# Patient Record
Sex: FEMALE | Race: WHITE | NOT HISPANIC OR LATINO | Employment: UNEMPLOYED | URBAN - METROPOLITAN AREA
[De-identification: names, ages, dates, MRNs, and addresses within clinical notes are randomized per-mention and may not be internally consistent; named-entity substitution may affect disease eponyms.]

---

## 2023-02-03 PROBLEM — J02.9 SORE THROAT: Status: RESOLVED | Noted: 2023-02-03 | Resolved: 2023-02-03

## 2023-02-03 PROBLEM — L03.031 PARONYCHIA OF GREAT TOE, RIGHT: Status: ACTIVE | Noted: 2023-02-03

## 2023-02-03 PROBLEM — M25.569 KNEE PAIN: Status: ACTIVE | Noted: 2023-02-03

## 2023-02-03 PROBLEM — M26.629 TMJ PAIN DYSFUNCTION SYNDROME: Status: ACTIVE | Noted: 2023-02-03

## 2023-02-03 PROBLEM — F41.9 ANXIETY DISORDER: Status: ACTIVE | Noted: 2023-02-03

## 2023-02-03 PROBLEM — M75.52 SUBACROMIAL BURSITIS OF LEFT SHOULDER JOINT: Status: ACTIVE | Noted: 2023-02-03

## 2023-02-03 PROBLEM — M25.519 SHOULDER PAIN: Status: ACTIVE | Noted: 2023-02-03

## 2023-02-03 PROBLEM — E03.9 HYPOTHYROIDISM: Status: ACTIVE | Noted: 2023-02-03

## 2023-02-03 PROBLEM — M25.512 PAIN IN JOINT OF LEFT SHOULDER: Status: ACTIVE | Noted: 2023-02-03

## 2023-02-03 PROBLEM — E78.00 HYPERCHOLESTEROLEMIA: Status: ACTIVE | Noted: 2023-02-03

## 2023-02-03 PROBLEM — M89.9 SCAPULAR DYSFUNCTION: Status: ACTIVE | Noted: 2023-02-03

## 2023-02-03 PROBLEM — M79.676 TOE PAIN: Status: ACTIVE | Noted: 2023-02-03

## 2023-02-03 PROBLEM — L71.0 PERIORAL DERMATITIS: Status: ACTIVE | Noted: 2023-02-03

## 2023-02-03 PROBLEM — R73.9 ELEVATED BLOOD SUGAR: Status: ACTIVE | Noted: 2023-02-03

## 2023-02-03 PROBLEM — R93.1 ABNORMAL HEART SCORE CT: Status: ACTIVE | Noted: 2023-02-03

## 2023-02-03 PROBLEM — L30.9 ECZEMA: Status: ACTIVE | Noted: 2023-02-03

## 2023-02-03 PROBLEM — E55.9 VITAMIN D DEFICIENCY: Status: ACTIVE | Noted: 2023-02-03

## 2023-02-03 PROBLEM — L29.9: Status: ACTIVE | Noted: 2023-02-03

## 2023-02-03 RX ORDER — ROSUVASTATIN CALCIUM 40 MG/1
1 TABLET, COATED ORAL DAILY
COMMUNITY
Start: 2019-11-11 | End: 2023-04-18 | Stop reason: SDUPTHER

## 2023-02-03 RX ORDER — PHENYLPROPANOLAMINE/CLEMASTINE 75-1.34MG
TABLET, EXTENDED RELEASE ORAL
COMMUNITY
Start: 2019-11-11 | End: 2023-04-18

## 2023-02-03 RX ORDER — HYALURONATE SODIUM 0.2 %
CREAM (GRAM) TOPICAL
COMMUNITY
Start: 2019-11-11 | End: 2023-04-18

## 2023-02-03 RX ORDER — ASPIRIN 81 MG/1
1 TABLET ORAL DAILY
COMMUNITY
Start: 2019-11-11

## 2023-02-03 RX ORDER — MOMETASONE FUROATE 1 MG/G
CREAM TOPICAL
COMMUNITY
Start: 2018-08-20 | End: 2023-03-24 | Stop reason: SDUPTHER

## 2023-02-03 RX ORDER — LEVOTHYROXINE SODIUM 100 UG/1
1 TABLET ORAL DAILY
COMMUNITY
Start: 2018-08-20 | End: 2023-04-18 | Stop reason: SDUPTHER

## 2023-02-03 RX ORDER — CLOTRIMAZOLE AND BETAMETHASONE DIPROPIONATE 10; .64 MG/G; MG/G
CREAM TOPICAL
COMMUNITY
Start: 2013-05-17

## 2023-02-03 RX ORDER — HYDROCORTISONE 25 MG/G
CREAM TOPICAL
COMMUNITY
Start: 2019-11-11 | End: 2023-04-18 | Stop reason: SDUPTHER

## 2023-02-03 RX ORDER — GUAIFENESIN 1200 MG
TABLET, EXTENDED RELEASE 12 HR ORAL
COMMUNITY
Start: 2019-11-11

## 2023-02-03 RX ORDER — MAG HYDROX/ALUMINUM HYD/SIMETH 200-200-20
SUSPENSION, ORAL (FINAL DOSE FORM) ORAL 2 TIMES DAILY
COMMUNITY
Start: 2019-11-11 | End: 2023-04-18

## 2023-02-14 PROBLEM — E05.90 HYPERTHYROIDISM: Status: ACTIVE | Noted: 2023-02-14

## 2023-03-24 DIAGNOSIS — L08.9 SKIN INFLAMMATION: ICD-10-CM

## 2023-03-26 RX ORDER — MOMETASONE FUROATE 1 MG/G
CREAM TOPICAL DAILY
Qty: 50 G | Refills: 0 | Status: SHIPPED | OUTPATIENT
Start: 2023-03-26 | End: 2023-10-10 | Stop reason: SDUPTHER

## 2023-04-15 LAB
ALANINE AMINOTRANSFERASE (SGPT) (U/L) IN SER/PLAS: 20 U/L (ref 7–45)
ALBUMIN (G/DL) IN SER/PLAS: 4.5 G/DL (ref 3.4–5)
ALKALINE PHOSPHATASE (U/L) IN SER/PLAS: 62 U/L (ref 33–136)
ANION GAP IN SER/PLAS: 14 MMOL/L (ref 10–20)
ASPARTATE AMINOTRANSFERASE (SGOT) (U/L) IN SER/PLAS: 25 U/L (ref 9–39)
BILIRUBIN TOTAL (MG/DL) IN SER/PLAS: 1.3 MG/DL (ref 0–1.2)
CALCIDIOL (25 OH VITAMIN D3) (NG/ML) IN SER/PLAS: 34 NG/ML
CALCIUM (MG/DL) IN SER/PLAS: 9.7 MG/DL (ref 8.6–10.6)
CARBON DIOXIDE, TOTAL (MMOL/L) IN SER/PLAS: 27 MMOL/L (ref 21–32)
CHLORIDE (MMOL/L) IN SER/PLAS: 105 MMOL/L (ref 98–107)
CHOLESTEROL (MG/DL) IN SER/PLAS: 158 MG/DL (ref 0–199)
CHOLESTEROL IN HDL (MG/DL) IN SER/PLAS: 64.5 MG/DL
CHOLESTEROL/HDL RATIO: 2.4
COBALAMIN (VITAMIN B12) (PG/ML) IN SER/PLAS: 321 PG/ML (ref 211–911)
CREATININE (MG/DL) IN SER/PLAS: 0.63 MG/DL (ref 0.5–1.05)
ERYTHROCYTE DISTRIBUTION WIDTH (RATIO) BY AUTOMATED COUNT: 12.9 % (ref 11.5–14.5)
ERYTHROCYTE MEAN CORPUSCULAR HEMOGLOBIN CONCENTRATION (G/DL) BY AUTOMATED: 31.5 G/DL (ref 32–36)
ERYTHROCYTE MEAN CORPUSCULAR VOLUME (FL) BY AUTOMATED COUNT: 93 FL (ref 80–100)
ERYTHROCYTES (10*6/UL) IN BLOOD BY AUTOMATED COUNT: 4.38 X10E12/L (ref 4–5.2)
GFR FEMALE: >90 ML/MIN/1.73M2
GLUCOSE (MG/DL) IN SER/PLAS: 82 MG/DL (ref 74–99)
HEMATOCRIT (%) IN BLOOD BY AUTOMATED COUNT: 40.6 % (ref 36–46)
HEMOGLOBIN (G/DL) IN BLOOD: 12.8 G/DL (ref 12–16)
LDL: 75 MG/DL (ref 0–99)
LEUKOCYTES (10*3/UL) IN BLOOD BY AUTOMATED COUNT: 5.2 X10E9/L (ref 4.4–11.3)
NRBC (PER 100 WBCS) BY AUTOMATED COUNT: 0 /100 WBC (ref 0–0)
PLATELETS (10*3/UL) IN BLOOD AUTOMATED COUNT: 243 X10E9/L (ref 150–450)
POTASSIUM (MMOL/L) IN SER/PLAS: 4.6 MMOL/L (ref 3.5–5.3)
PROTEIN TOTAL: 6.6 G/DL (ref 6.4–8.2)
SODIUM (MMOL/L) IN SER/PLAS: 141 MMOL/L (ref 136–145)
THYROTROPIN (MIU/L) IN SER/PLAS BY DETECTION LIMIT <= 0.05 MIU/L: 3.72 MIU/L (ref 0.44–3.98)
TRIGLYCERIDE (MG/DL) IN SER/PLAS: 94 MG/DL (ref 0–149)
UREA NITROGEN (MG/DL) IN SER/PLAS: 14 MG/DL (ref 6–23)
VLDL: 19 MG/DL (ref 0–40)

## 2023-04-18 ENCOUNTER — OFFICE VISIT (OUTPATIENT)
Dept: PRIMARY CARE | Facility: CLINIC | Age: 63
End: 2023-04-18
Payer: COMMERCIAL

## 2023-04-18 VITALS
BODY MASS INDEX: 29.77 KG/M2 | HEIGHT: 63 IN | WEIGHT: 168 LBS | DIASTOLIC BLOOD PRESSURE: 74 MMHG | SYSTOLIC BLOOD PRESSURE: 130 MMHG

## 2023-04-18 DIAGNOSIS — E55.9 VITAMIN D DEFICIENCY: ICD-10-CM

## 2023-04-18 DIAGNOSIS — E05.90 HYPERTHYROIDISM: ICD-10-CM

## 2023-04-18 DIAGNOSIS — Z00.00 PREVENTATIVE HEALTH CARE: Primary | ICD-10-CM

## 2023-04-18 DIAGNOSIS — L30.9 ECZEMA, UNSPECIFIED TYPE: ICD-10-CM

## 2023-04-18 DIAGNOSIS — E78.5 HYPERLIPIDEMIA, UNSPECIFIED HYPERLIPIDEMIA TYPE: ICD-10-CM

## 2023-04-18 PROCEDURE — 1036F TOBACCO NON-USER: CPT | Performed by: INTERNAL MEDICINE

## 2023-04-18 PROCEDURE — 99396 PREV VISIT EST AGE 40-64: CPT | Performed by: INTERNAL MEDICINE

## 2023-04-18 PROCEDURE — 93000 ELECTROCARDIOGRAM COMPLETE: CPT | Performed by: INTERNAL MEDICINE

## 2023-04-18 RX ORDER — LEVOTHYROXINE SODIUM 100 UG/1
100 TABLET ORAL DAILY
Qty: 90 TABLET | Refills: 1 | Status: SHIPPED | OUTPATIENT
Start: 2023-04-18 | End: 2023-04-25

## 2023-04-18 RX ORDER — FLUTICASONE PROPIONATE 50 MCG
2 SPRAY, SUSPENSION (ML) NASAL DAILY
COMMUNITY
Start: 2022-05-14

## 2023-04-18 RX ORDER — HYDROCORTISONE 25 MG/G
CREAM TOPICAL 2 TIMES DAILY
Qty: 28 G | Refills: 1 | Status: SHIPPED | OUTPATIENT
Start: 2023-04-18 | End: 2024-04-09 | Stop reason: SDUPTHER

## 2023-04-18 RX ORDER — ROSUVASTATIN CALCIUM 40 MG/1
40 TABLET, COATED ORAL DAILY
Qty: 90 TABLET | Refills: 1 | Status: SHIPPED | OUTPATIENT
Start: 2023-04-18 | End: 2023-07-30

## 2023-04-18 NOTE — PROGRESS NOTES
"Subjective   Patient ID: Rose Grigsby is a 62 y.o. female who presents for Follow-up and Med Refill.    Med Refill  Patient is here for physical   needs refills on medication  Follow-up on hypothyroidism and high cholesterol  Wants refill on steroid cream for ear itch  Wants refill on steroid cream for dry skin which is prescribed by her dermatologist but she does not want to go them again    Past recap  Patient is here for follow-up/physical  Follow-up on high cholesterol hypothyroidism  Patient is still trying to get her dental work bill which have been from her fall in the parking lot at [employer]. done with work      Social history non-smoker family history Brother  at 42 of [place] father  at 69 of [place] Sister had colon cancer  Complaining of left foot big toe pain she had surgery for ingrown toenail     Cardiologist had change her cholesterol medication to Crestor 40 mg a day but last time her liver function was high so we decided to cut down the dose to 20 mg a day     Patient is here for follow-up on blood work  Needs taking shingles vaccine  Needs refills on medications for high cholesterol and hypothyroidism  Did blood work  Had a fall in [employer] or below 3 front teeth        past recap  To reestablish PCP  Follow-up on hypothyroidism needs blood work results and renewal of medication  Her calcium cardiac scoring was high she is on statin she saw cardiologist and had stress test which was negative  Also due for vitamin D check she had low in the past  She has lost body weight secondary to dieting  She was told GYN Dr. Hernández  This history of diverticular disease  She has a daughter with spatial needs she takes care of her  Older Sis has precancerous polyp        Review of Systems    Objective   /74   Ht 1.6 m (5' 3\")   Wt 76.2 kg (168 lb)   BMI 29.76 kg/m²     Physical Exam  Vitals reviewed.   Constitutional:       Appearance: Normal appearance.   HENT:      Head: Normocephalic and " atraumatic.      Right Ear: Tympanic membrane, ear canal and external ear normal.      Left Ear: Tympanic membrane, ear canal and external ear normal.      Nose: Nose normal.      Mouth/Throat:      Pharynx: Oropharynx is clear.   Eyes:      Extraocular Movements: Extraocular movements intact.      Conjunctiva/sclera: Conjunctivae normal.      Pupils: Pupils are equal, round, and reactive to light.   Cardiovascular:      Rate and Rhythm: Normal rate and regular rhythm.      Pulses: Normal pulses.      Heart sounds: Normal heart sounds.   Pulmonary:      Effort: Pulmonary effort is normal.      Breath sounds: Normal breath sounds.   Abdominal:      General: Abdomen is flat. Bowel sounds are normal.      Palpations: Abdomen is soft.   Musculoskeletal:      Cervical back: Normal range of motion and neck supple.   Skin:     General: Skin is warm and dry.   Neurological:      General: No focal deficit present.      Mental Status: She is alert and oriented to person, place, and time.   Psychiatric:         Mood and Affect: Mood normal.       Assessment/Plan   Problem List Items Addressed This Visit          Endocrine/Metabolic    Vitamin D deficiency    Relevant Orders    Vitamin D, Total    Vitamin B12    Comprehensive Metabolic Panel    Hyperthyroidism    Relevant Medications    levothyroxine (Synthroid, Levoxyl) 100 mcg tablet    Other Relevant Orders    Thyroid Stimulating Hormone    Vitamin B12    Comprehensive Metabolic Panel       Infectious/Inflammatory    Eczema    Relevant Medications    hydrocortisone 2.5 % cream    Other Relevant Orders    Vitamin B12    Comprehensive Metabolic Panel     Other Visit Diagnoses       Preventative health care    -  Primary    Relevant Orders    ECG 12 lead    Vitamin B12    Comprehensive Metabolic Panel    Hyperlipidemia, unspecified hyperlipidemia type        Relevant Medications    rosuvastatin (Crestor) 40 mg tablet    Other Relevant Orders    Vitamin B12    Comprehensive  Metabolic Panel    Lipid Panel          physical normal  Blood work reviewed  #1 Hypothyroidism  Synthroid 100 Âµg  #2 hyperlipidemia  Cholesterol well controlled on 20 mg of Crestor  #3 Eczema  Steroid cream prescribed  #4  Dry skin   Refills given on steroid cream advised to keep skin moisturized   BMD shows mild osteopenia.  Discussed diet calcium and vitamin D supplement and exercise   follow-up blood work in 6 months  Colonoscopy was in 2018

## 2023-04-25 DIAGNOSIS — E05.90 HYPERTHYROIDISM: ICD-10-CM

## 2023-04-25 RX ORDER — LEVOTHYROXINE SODIUM 100 UG/1
TABLET ORAL
Qty: 90 TABLET | Refills: 1 | Status: SHIPPED | OUTPATIENT
Start: 2023-04-25 | End: 2023-10-10 | Stop reason: SDUPTHER

## 2023-07-29 DIAGNOSIS — E78.5 HYPERLIPIDEMIA, UNSPECIFIED HYPERLIPIDEMIA TYPE: ICD-10-CM

## 2023-07-30 RX ORDER — ROSUVASTATIN CALCIUM 40 MG/1
40 TABLET, COATED ORAL DAILY
Qty: 90 TABLET | Refills: 0 | Status: SHIPPED | OUTPATIENT
Start: 2023-07-30 | End: 2024-04-09 | Stop reason: SDUPTHER

## 2023-10-06 ENCOUNTER — TELEPHONE (OUTPATIENT)
Dept: PRIMARY CARE | Facility: CLINIC | Age: 63
End: 2023-10-06
Payer: COMMERCIAL

## 2023-10-07 ENCOUNTER — LAB (OUTPATIENT)
Dept: LAB | Facility: LAB | Age: 63
End: 2023-10-07
Payer: COMMERCIAL

## 2023-10-07 DIAGNOSIS — E78.5 HYPERLIPIDEMIA, UNSPECIFIED HYPERLIPIDEMIA TYPE: ICD-10-CM

## 2023-10-07 DIAGNOSIS — E55.9 VITAMIN D DEFICIENCY: ICD-10-CM

## 2023-10-07 DIAGNOSIS — E05.90 HYPERTHYROIDISM: ICD-10-CM

## 2023-10-07 DIAGNOSIS — Z00.00 PREVENTATIVE HEALTH CARE: ICD-10-CM

## 2023-10-07 DIAGNOSIS — L30.9 ECZEMA, UNSPECIFIED TYPE: ICD-10-CM

## 2023-10-07 LAB
25(OH)D3 SERPL-MCNC: 43 NG/ML (ref 30–100)
ALBUMIN SERPL BCP-MCNC: 4.8 G/DL (ref 3.4–5)
ALP SERPL-CCNC: 63 U/L (ref 33–136)
ALT SERPL W P-5'-P-CCNC: 24 U/L (ref 7–45)
ANION GAP SERPL CALC-SCNC: 13 MMOL/L (ref 10–20)
AST SERPL W P-5'-P-CCNC: 29 U/L (ref 9–39)
BILIRUB SERPL-MCNC: 1.4 MG/DL (ref 0–1.2)
BUN SERPL-MCNC: 13 MG/DL (ref 6–23)
CALCIUM SERPL-MCNC: 9.6 MG/DL (ref 8.6–10.6)
CHLORIDE SERPL-SCNC: 104 MMOL/L (ref 98–107)
CHOLEST SERPL-MCNC: 170 MG/DL (ref 0–199)
CHOLESTEROL/HDL RATIO: 2.5
CO2 SERPL-SCNC: 29 MMOL/L (ref 21–32)
CREAT SERPL-MCNC: 0.75 MG/DL (ref 0.5–1.05)
GFR SERPL CREATININE-BSD FRML MDRD: 89 ML/MIN/1.73M*2
GLUCOSE SERPL-MCNC: 90 MG/DL (ref 74–99)
HDLC SERPL-MCNC: 66.7 MG/DL
LDLC SERPL CALC-MCNC: 75 MG/DL (ref 140–190)
NON HDL CHOLESTEROL: 103 MG/DL (ref 0–149)
POTASSIUM SERPL-SCNC: 4.4 MMOL/L (ref 3.5–5.3)
PROT SERPL-MCNC: 7.3 G/DL (ref 6.4–8.2)
SODIUM SERPL-SCNC: 142 MMOL/L (ref 136–145)
TRIGL SERPL-MCNC: 144 MG/DL (ref 0–149)
TSH SERPL-ACNC: 3.68 MIU/L (ref 0.44–3.98)
VIT B12 SERPL-MCNC: 313 PG/ML (ref 211–911)
VLDL: 29 MG/DL (ref 0–40)

## 2023-10-07 PROCEDURE — 80061 LIPID PANEL: CPT

## 2023-10-07 PROCEDURE — 82607 VITAMIN B-12: CPT

## 2023-10-07 PROCEDURE — 82306 VITAMIN D 25 HYDROXY: CPT

## 2023-10-07 PROCEDURE — 80053 COMPREHEN METABOLIC PANEL: CPT

## 2023-10-07 PROCEDURE — 84443 ASSAY THYROID STIM HORMONE: CPT

## 2023-10-07 PROCEDURE — 36415 COLL VENOUS BLD VENIPUNCTURE: CPT

## 2023-10-10 ENCOUNTER — OFFICE VISIT (OUTPATIENT)
Dept: PRIMARY CARE | Facility: CLINIC | Age: 63
End: 2023-10-10
Payer: COMMERCIAL

## 2023-10-10 VITALS
DIASTOLIC BLOOD PRESSURE: 66 MMHG | HEIGHT: 63 IN | SYSTOLIC BLOOD PRESSURE: 122 MMHG | BODY MASS INDEX: 29.59 KG/M2 | WEIGHT: 167 LBS

## 2023-10-10 DIAGNOSIS — Z23 NEED FOR INFLUENZA VACCINATION: ICD-10-CM

## 2023-10-10 DIAGNOSIS — L30.9 DERMATITIS: ICD-10-CM

## 2023-10-10 DIAGNOSIS — L08.9 SKIN INFLAMMATION: ICD-10-CM

## 2023-10-10 DIAGNOSIS — E78.00 HYPERCHOLESTEROLEMIA: ICD-10-CM

## 2023-10-10 DIAGNOSIS — E03.8 OTHER SPECIFIED HYPOTHYROIDISM: Primary | ICD-10-CM

## 2023-10-10 PROBLEM — L71.9 ROSACEA, UNSPECIFIED: Status: ACTIVE | Noted: 2020-11-17

## 2023-10-10 PROBLEM — D18.01 HEMANGIOMA OF SKIN AND SUBCUTANEOUS TISSUE: Status: ACTIVE | Noted: 2020-11-17

## 2023-10-10 PROBLEM — L81.4 OTHER MELANIN HYPERPIGMENTATION: Status: ACTIVE | Noted: 2020-11-17

## 2023-10-10 PROBLEM — L21.9 SEBORRHEIC DERMATITIS, UNSPECIFIED: Status: ACTIVE | Noted: 2020-11-17

## 2023-10-10 PROBLEM — N76.3 CHRONIC VULVITIS: Status: ACTIVE | Noted: 2023-10-10

## 2023-10-10 PROBLEM — L73.8 OTHER SPECIFIED FOLLICULAR DISORDERS: Status: ACTIVE | Noted: 2020-11-17

## 2023-10-10 PROBLEM — L28.1 PRURIGO NODULARIS: Status: ACTIVE | Noted: 2020-11-17

## 2023-10-10 PROBLEM — L85.3 XEROSIS CUTIS: Status: ACTIVE | Noted: 2020-11-17

## 2023-10-10 PROBLEM — L82.1 OTHER SEBORRHEIC KERATOSIS: Status: ACTIVE | Noted: 2020-11-17

## 2023-10-10 PROBLEM — E78.5 HYPERLIPIDEMIA: Status: ACTIVE | Noted: 2023-10-10

## 2023-10-10 PROBLEM — L91.8 OTHER HYPERTROPHIC DISORDERS OF THE SKIN: Status: ACTIVE | Noted: 2020-11-17

## 2023-10-10 PROBLEM — D22.5 MELANOCYTIC NEVI OF TRUNK: Status: ACTIVE | Noted: 2020-11-17

## 2023-10-10 PROBLEM — R92.8 ABNORMAL FINDING ON MAMMOGRAPHY: Status: ACTIVE | Noted: 2023-10-10

## 2023-10-10 PROBLEM — D22.30 MELANOCYTIC NEVI OF UNSPECIFIED PART OF FACE: Status: ACTIVE | Noted: 2020-11-17

## 2023-10-10 PROCEDURE — 90686 IIV4 VACC NO PRSV 0.5 ML IM: CPT | Performed by: INTERNAL MEDICINE

## 2023-10-10 PROCEDURE — 90471 IMMUNIZATION ADMIN: CPT | Performed by: INTERNAL MEDICINE

## 2023-10-10 PROCEDURE — 99213 OFFICE O/P EST LOW 20 MIN: CPT | Performed by: INTERNAL MEDICINE

## 2023-10-10 PROCEDURE — 1036F TOBACCO NON-USER: CPT | Performed by: INTERNAL MEDICINE

## 2023-10-10 RX ORDER — AMMONIUM LACTATE 12 G/100G
CREAM TOPICAL AS NEEDED
COMMUNITY

## 2023-10-10 RX ORDER — CHOLECALCIFEROL (VITAMIN D3) 50 MCG
50 TABLET ORAL DAILY
COMMUNITY

## 2023-10-10 RX ORDER — LEVOTHYROXINE SODIUM 100 UG/1
100 TABLET ORAL DAILY
Qty: 90 TABLET | Refills: 1 | Status: SHIPPED | OUTPATIENT
Start: 2023-10-10 | End: 2023-11-18

## 2023-10-10 RX ORDER — DICLOFENAC SODIUM 10 MG/G
4 GEL TOPICAL 4 TIMES DAILY
COMMUNITY

## 2023-10-10 RX ORDER — MOMETASONE FUROATE 1 MG/G
CREAM TOPICAL DAILY
Qty: 50 G | Refills: 1 | Status: SHIPPED | OUTPATIENT
Start: 2023-10-10 | End: 2024-04-09 | Stop reason: SDUPTHER

## 2023-10-10 NOTE — PROGRESS NOTES
"Subjective   Patient ID: Rose Grigsby is a 62 y.o. female who presents for Follow-up (6 month follow up).    Med Refill    Patient is here for follow-up  needs refills on medication  Follow-up on hypothyroidism and high cholesterol  Wants refill on steroid cream for ear itch  Wants refill on steroid cream for dry skin which is prescribed by her dermatologist but she does not want to go them again  Colonoscopy in 2018 had diverticulosis    Past recap  Patient is here for follow-up/physical  Follow-up on high cholesterol hypothyroidism  Patient is still trying to get her dental work bill which have been from her fall in the parking lot at [employer]. done with work      Social history non-smoker family history Brother  at 42 of MI father  at 69 of [place] Sister had colon cancer  Complaining of left foot big toe pain she had surgery for ingrown toenail     Cardiologist had change her cholesterol medication to Crestor 40 mg a day but last time her liver function was high so we decided to cut down the dose to 20 mg a day     Patient is here for follow-up on blood work  Needs taking shingles vaccine  Needs refills on medications for high cholesterol and hypothyroidism  Did blood work  Had a fall in [employer] parking or below 3 front teeth        past recap  To reestablish PCP  Follow-up on hypothyroidism needs blood work results and renewal of medication  Her calcium cardiac scoring was high she is on statin she saw cardiologist and had stress test which was negative  Also due for vitamin D check she had low in the past  She has lost body weight secondary to dieting  She was told GYN Dr. Hernández  This history of diverticular disease  She has a daughter with spatial needs she takes care of her  Older Sis has precancerous polyp        Review of Systems    Objective   /66   Ht 1.6 m (5' 3\")   Wt 75.8 kg (167 lb)   BMI 29.58 kg/m²     Physical Exam  Vitals reviewed.   Constitutional:       Appearance: Normal " appearance.   HENT:      Head: Normocephalic and atraumatic.      Right Ear: Tympanic membrane, ear canal and external ear normal.      Left Ear: Tympanic membrane, ear canal and external ear normal.      Nose: Nose normal.      Mouth/Throat:      Pharynx: Oropharynx is clear.   Eyes:      Extraocular Movements: Extraocular movements intact.      Conjunctiva/sclera: Conjunctivae normal.      Pupils: Pupils are equal, round, and reactive to light.   Cardiovascular:      Rate and Rhythm: Normal rate and regular rhythm.      Pulses: Normal pulses.      Heart sounds: Normal heart sounds.   Pulmonary:      Effort: Pulmonary effort is normal.      Breath sounds: Normal breath sounds.   Abdominal:      General: Abdomen is flat. Bowel sounds are normal.      Palpations: Abdomen is soft.   Musculoskeletal:      Cervical back: Normal range of motion and neck supple.   Skin:     General: Skin is warm and dry.   Neurological:      General: No focal deficit present.      Mental Status: She is alert and oriented to person, place, and time.   Psychiatric:         Mood and Affect: Mood normal.         Assessment/Plan   Problem List Items Addressed This Visit          Cardiac and Vasculature    Hypercholesterolemia    Relevant Orders    Comprehensive Metabolic Panel    Lipid Panel       Endocrine/Metabolic    Hyperthyroidism - Primary    Relevant Medications    Synthroid 100 mcg tablet       Skin    Dermatitis     Other Visit Diagnoses       Skin inflammation        Relevant Medications    mometasone (Elocon) 0.1 % cream    Need for influenza vaccination        Relevant Orders    Flu vaccine (IIV4) age 6 months and greater, preservative free (Completed)            Blood work reviewed  #1 Hypothyroidism  Synthroid 100 Âµg  #2 hyperlipidemia  Cholesterol well controlled on 20 mg of Crestor  #3 Eczema  Steroid cream prescribed  #4  Dry skin   Refills given on steroid cream advised to keep skin moisturized   BMD shows mild osteopenia.   Discussed diet calcium and vitamin D supplement and exercise   follow-up blood work in 6 months  Colonoscopy was in 2018

## 2023-11-09 ENCOUNTER — ANCILLARY PROCEDURE (OUTPATIENT)
Dept: RADIOLOGY | Facility: CLINIC | Age: 63
End: 2023-11-09
Payer: COMMERCIAL

## 2023-11-09 ENCOUNTER — APPOINTMENT (OUTPATIENT)
Dept: RADIOLOGY | Facility: CLINIC | Age: 63
End: 2023-11-09
Payer: COMMERCIAL

## 2023-11-09 ENCOUNTER — OFFICE VISIT (OUTPATIENT)
Dept: PRIMARY CARE | Facility: CLINIC | Age: 63
End: 2023-11-09
Payer: COMMERCIAL

## 2023-11-09 VITALS
SYSTOLIC BLOOD PRESSURE: 122 MMHG | HEIGHT: 63 IN | BODY MASS INDEX: 29.06 KG/M2 | WEIGHT: 164 LBS | DIASTOLIC BLOOD PRESSURE: 78 MMHG

## 2023-11-09 DIAGNOSIS — R35.0 URINARY FREQUENCY: ICD-10-CM

## 2023-11-09 DIAGNOSIS — M54.50 ACUTE BILATERAL LOW BACK PAIN WITHOUT SCIATICA: ICD-10-CM

## 2023-11-09 DIAGNOSIS — N30.01 ACUTE CYSTITIS WITH HEMATURIA: ICD-10-CM

## 2023-11-09 DIAGNOSIS — R31.9 HEMATURIA, UNSPECIFIED TYPE: ICD-10-CM

## 2023-11-09 LAB
POC APPEARANCE, URINE: CLEAR
POC BILIRUBIN, URINE: NEGATIVE
POC BLOOD, URINE: ABNORMAL
POC COLOR, URINE: YELLOW
POC GLUCOSE, URINE: NEGATIVE MG/DL
POC KETONES, URINE: NEGATIVE MG/DL
POC LEUKOCYTES, URINE: NEGATIVE
POC NITRITE,URINE: NEGATIVE
POC PH, URINE: 6 PH
POC PROTEIN, URINE: NEGATIVE MG/DL
POC SPECIFIC GRAVITY, URINE: <=1.005
POC UROBILINOGEN, URINE: 0.2 EU/DL

## 2023-11-09 PROCEDURE — 72110 X-RAY EXAM L-2 SPINE 4/>VWS: CPT | Mod: FOREIGN READ | Performed by: RADIOLOGY

## 2023-11-09 PROCEDURE — 72110 X-RAY EXAM L-2 SPINE 4/>VWS: CPT | Mod: FR

## 2023-11-09 PROCEDURE — 99214 OFFICE O/P EST MOD 30 MIN: CPT | Performed by: INTERNAL MEDICINE

## 2023-11-09 PROCEDURE — 76770 US EXAM ABDO BACK WALL COMP: CPT

## 2023-11-09 PROCEDURE — 81003 URINALYSIS AUTO W/O SCOPE: CPT | Performed by: INTERNAL MEDICINE

## 2023-11-09 PROCEDURE — 74176 CT ABD & PELVIS W/O CONTRAST: CPT

## 2023-11-09 PROCEDURE — 87086 URINE CULTURE/COLONY COUNT: CPT

## 2023-11-09 PROCEDURE — 1036F TOBACCO NON-USER: CPT | Performed by: INTERNAL MEDICINE

## 2023-11-09 PROCEDURE — 74176 CT ABD & PELVIS W/O CONTRAST: CPT | Performed by: RADIOLOGY

## 2023-11-09 PROCEDURE — 76770 US EXAM ABDO BACK WALL COMP: CPT | Performed by: RADIOLOGY

## 2023-11-09 ASSESSMENT — ENCOUNTER SYMPTOMS
DEPRESSION: 0
LOSS OF SENSATION IN FEET: 0
OCCASIONAL FEELINGS OF UNSTEADINESS: 0
BACK PAIN: 1

## 2023-11-10 LAB — BACTERIA UR CULT: NO GROWTH

## 2023-11-10 NOTE — PROGRESS NOTES
Subjective   Patient ID: Rose Grigsby is a 62 y.o. female who presents for Back Pain (On going x4 days).    Med Refill    Back Pain    Patient is here with complaints of having back pain for last 4 days denies any burning urination or pain started on  and Monday got worse Tuesday tried Bengay but the back pain is not getting better  Denies any trauma     patient is here for follow-up  needs refills on medication  Follow-up on hypothyroidism and high cholesterol  Wants refill on steroid cream for ear itch  Wants refill on steroid cream for dry skin which is prescribed by her dermatologist but she does not want to go them again  Colonoscopy in 2018 had diverticulosis    Past recap  Patient is here for follow-up/physical  Follow-up on high cholesterol hypothyroidism  Patient is still trying to get her dental work bill which have been from her fall in the parking lot at [facility]. done with work      Social history non-smoker family history Brother  at 42 of MI father  at 69 of [place] Sister had colon cancer  Complaining of left foot big toe pain she had surgery for ingrown toenail     Cardiologist had change her cholesterol medication to Crestor 40 mg a day but last time her liver function was high so we decided to cut down the dose to 20 mg a day     Patient is here for follow-up on blood work  Needs taking shingles vaccine  Needs refills on medications for high cholesterol and hypothyroidism  Did blood work  Had a fall in [employer] parking or below 3 front teeth        past recap  To reestablish PCP  Follow-up on hypothyroidism needs blood work results and renewal of medication  Her calcium cardiac scoring was high she is on statin she saw cardiologist and had stress test which was negative  Also due for vitamin D check she had low in the past  She has lost body weight secondary to dieting  She was told GYN Dr. Hernández  This history of diverticular disease  She has a daughter with spatial needs she  "takes care of her  Older Sis has precancerous polyp        Review of Systems   Musculoskeletal:  Positive for back pain.       Objective   /78   Ht 1.6 m (5' 3\")   Wt 74.4 kg (164 lb)   BMI 29.05 kg/m²     Physical Exam  Vitals reviewed.   Constitutional:       Appearance: Normal appearance.   HENT:      Head: Normocephalic and atraumatic.      Right Ear: Tympanic membrane, ear canal and external ear normal.      Left Ear: Tympanic membrane, ear canal and external ear normal.      Nose: Nose normal.      Mouth/Throat:      Pharynx: Oropharynx is clear.   Eyes:      Extraocular Movements: Extraocular movements intact.      Conjunctiva/sclera: Conjunctivae normal.      Pupils: Pupils are equal, round, and reactive to light.   Cardiovascular:      Rate and Rhythm: Normal rate and regular rhythm.      Pulses: Normal pulses.      Heart sounds: Normal heart sounds.   Pulmonary:      Effort: Pulmonary effort is normal.      Breath sounds: Normal breath sounds.   Abdominal:      General: Abdomen is flat. Bowel sounds are normal.      Palpations: Abdomen is soft.   Musculoskeletal:         General: Tenderness present.      Cervical back: Normal range of motion and neck supple.      Comments: Tenderness over sacroiliac joint   Skin:     General: Skin is warm and dry.   Neurological:      General: No focal deficit present.      Mental Status: She is alert and oriented to person, place, and time.   Psychiatric:         Mood and Affect: Mood normal.         Assessment/Plan   Problem List Items Addressed This Visit    None  Visit Diagnoses       Acute bilateral low back pain without sciatica        Relevant Orders    XR lumbar spine complete 4+ views (Completed)    Referral to Physical Therapy    Urinary frequency        Relevant Orders    POCT UA Automated manually resulted (Completed)    Acute cystitis with hematuria        Relevant Orders    US renal complete (Completed)    Hematuria, unspecified type        Relevant " Orders    CT abdomen pelvis wo IV contrast (Completed)    Urine Culture          Past recap  Blood work reviewed  #1 Hypothyroidism  Synthroid 100 Âµg  #2 hyperlipidemia  Cholesterol well controlled on 20 mg of Crestor  #3 Eczema  Steroid cream prescribed  #4  Dry skin   Refills given on steroid cream advised to keep skin moisturized   BMD shows mild osteopenia.  Discussed diet calcium and vitamin D supplement and exercise   follow-up blood work in 6 months  Colonoscopy was in 2018     11/9/2023  X-ray of the lumbosacral spine  UA shows blood  We will get CT of the abdomen pelvis to rule out kidney stone  X-ray of the back was reviewed  Bone is demineralized encourage patient to take vitamin D and calcium and do weightbearing exercises  Patient has moderate bony degenerative changes which could be causing the pain  Discussed stretching exercises  Treat with anti-inflammatory  Moderate atherosclerotic calcification  Patient had elevated CT cardiac scoring 2  She was never smoker  Discussed lifestyle modifications with exercise dietary controlled controlled sugar control cholesterol  Follow-up if not better

## 2023-11-17 DIAGNOSIS — E03.8 OTHER SPECIFIED HYPOTHYROIDISM: ICD-10-CM

## 2023-11-18 RX ORDER — LEVOTHYROXINE SODIUM 100 UG/1
100 TABLET ORAL DAILY
Qty: 90 TABLET | Refills: 0 | Status: SHIPPED | OUTPATIENT
Start: 2023-11-18 | End: 2023-11-20 | Stop reason: SDUPTHER

## 2023-11-20 DIAGNOSIS — E03.8 OTHER SPECIFIED HYPOTHYROIDISM: ICD-10-CM

## 2023-11-20 RX ORDER — LEVOTHYROXINE SODIUM 100 UG/1
100 TABLET ORAL DAILY
Qty: 90 TABLET | Refills: 0 | Status: SHIPPED | OUTPATIENT
Start: 2023-11-20 | End: 2024-04-09 | Stop reason: SDUPTHER

## 2023-11-30 ENCOUNTER — EVALUATION (OUTPATIENT)
Dept: PHYSICAL THERAPY | Facility: CLINIC | Age: 63
End: 2023-11-30
Payer: COMMERCIAL

## 2023-11-30 DIAGNOSIS — M54.50 ACUTE BILATERAL LOW BACK PAIN WITHOUT SCIATICA: ICD-10-CM

## 2023-11-30 PROCEDURE — 97110 THERAPEUTIC EXERCISES: CPT | Mod: GP

## 2023-11-30 PROCEDURE — 97161 PT EVAL LOW COMPLEX 20 MIN: CPT | Mod: GP

## 2023-11-30 NOTE — PROGRESS NOTES
Physical Therapy Evaluation     Patient Name: Rose Grigsby  MRN: [id number]  Today's Date: 11/30/2023         Insurance:  Number of Treatments Authorized: 1/MN.  Jose Armando cosme, Yoly, 80/20 coverage, ded = $2000 - met.          Current Problem:   1. Acute bilateral low back pain without sciatica  Referral to Physical Therapy          Precautions:  Precautions  Precautions Comment: None      Reason for visit: LBP  Referred by: Dr. 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  Assists with her self care/bathing/brushing teeth (rotates right)/ hands and knees to don doff shoes/socks/do her nails.  Has 2 days withat her daughter goes to day camp. Does all laundry/cooking/cleaning/errands.  Leisure, mechanical stresses: Uses her computer.  Book club.  Prior to onset the pt was able to complete all work/leisure/functional activities without limitation.  Functional disability from present episode: None stated.  Present symptoms: 3/10 LBP   Present since: 11/5/23 and getting better  Commenced for no apparent reason  Symptoms at onset: back and left groin/hip/thigh  Constant symptoms: none  Intermittent symptoms: Mostly LBP, Left thigh x 1 a week  Pain ranges from: 0-5/10  Pt describes pain as: tight/sore/ache   Symptoms are worse with: bending - most of time, standing/walking - NE, lying - NE, pm - sometimes  Symptoms are better with:  sitting- uses a lumbar roll feels good, standing - NE, walking - NE, lying - NE , on the move   Disturbed sleep: Yes initially, but not anymore.  Previous episodes: none  Previous treatments: 400 mg Ibuprofen - did not help. Bengay - did not help.  Neuropathic oil - helped   Imaging: US for renal and CT scan of abdomen - normal.  Xrays for lumbar spine - degeneration   Strain - sometimes  Bladder: Normal  Red Flags: recent/major surgery - none , night pain - no, accidents - no, unexplained weight loss - no  Medical history: Heart disease,  Hypothyroid, OA      Objective Findings:   Outcome Measures:     Posture: Good  Correction: NE   Lateral shift: nil     Lumbar spine movement Loss - Nil/Min/Mod/Max limit or degrees  Flexion: min, ERP  Extension: mod, ERP  R SGIS: nil  L SGIS: Min, ERP    Repeated Movement Testing -  During/after/mechanical response:  Sx in standin/10 LBP  RFIS: x 10 - Stretch, NW  NIKUNJ: x 10- Stretch, NW  NIKUNJ over plinth x 10 - stretch, Better  Sx in lyin/10 LBP   RFIL: 10 x 2  - Produce left groin, abolish, W in standing, NE on ROM  REIL: 5 x 4 - Produce tension,     Treatment:   NIKUNJ over plinth x 10  Reviewed proper position of lumbar roll, and sitting with roll x 3 min   Educated pt on proper response to exercise, centralization/localization, produce/increase - NW principle, and assessment process.  Issued handout for HEP  HEP/med bridge:   Access Code: [email or web address]  URL: [email or web address]  Date: 2023  Prepared by: Roby Nayak  Exercises  - Standing Lumbar Extension  - 5-8 x daily - 7 x weekly - 1 sets - 10 -20 reps  - Seated Correct Posture     Assessment: Pt presents to PT with complaint of LBP that began 23 without PERRY. Pt's history and response to repeated movements makes provisional classification L/S posterior derangement with DP for NIKUNJ. Pt will continue to be assessed over the next 3-5 sessions to confirm provisional classification and DP. Pt will benefit from skilled PT to address ROM/strength/functional limitations and pain noted during evaluation today.    Plan:  Problem List: Pain, Functional limitations, activity limitations, ROM limitations, Posture, Gait, Transfer, Activity Limitations, IADLS/ADLS/Self care  Goals:  STG:  Pain = 0-2/10 and no left thigh/hip/groin pain by week 3  Pt will demo proper position/use of lumbar roll by week 1  Pt will be able to complete all light house work/cooking/cleaning at prior level with min/nil LBP by week 3  LTG:  L/S ROM = nil  limit in all directions by week 6  BERRY </= 5% by week 6  Pt will report >/= 80% improvement/progress towards PT goals by week 6  Pt will be able to complete all caregiver activities for her daughter with >/= 75% reduction in LBP frequency/intensity by week 6  -Planned interventions:Ther ex, Neuromuscular re-ed, ther act, Manual, Education, Home program, Estim, Hot therapy, Cold therapy, Vaso

## 2023-12-05 ENCOUNTER — TREATMENT (OUTPATIENT)
Dept: PHYSICAL THERAPY | Facility: CLINIC | Age: 63
End: 2023-12-05
Payer: COMMERCIAL

## 2023-12-05 DIAGNOSIS — M54.50 ACUTE BILATERAL LOW BACK PAIN WITHOUT SCIATICA: ICD-10-CM

## 2023-12-05 PROCEDURE — 97110 THERAPEUTIC EXERCISES: CPT | Mod: GP

## 2023-12-05 NOTE — PROGRESS NOTES
Physical Therapy  Physical Therapy Treatment Note    Patient Name: Rose Grigsby  MRN: [id number]  Today's Date: 12/5/2023       Insurance:           Current Problem  1. Acute bilateral low back pain without sciatica  Follow Up In Physical Therapy          Precautions       Subjective   General   Patient reports:  Was sore yesterday.  Overall no changes, HEP did not make it worse.      Performing HEP?: Partially  NIKUNJ x 1-2 and using with roll     Pain   1-2/10 stiffness.     Objective   L/S Movement Loss - Nil/Min/Mod/Max limit or degrees   Flexion: min - decrease, NB   Extension: min - NE   R SGIS: nil, tension   L SGIS: min, left thigh     Treatments:  Therex:   Sitting with roll x 2 min  NIKUNJ over plinth x 20 - NE, NE  RFIS 10 x 2 - increase, NW  BESS x 1 min  REIL x 5  BESS x1 min  REIL x 5  BESS x 1 min  REIL x 5 - feels  worked, increased ROM, no left thigh with left SGIS   Left SGIS x 10  NIKUNJ, small range x 5  Left SGIS x 10   NIKUNJ small range x 5     OP EDUCATION:   Educated on importance of HEP complaince  Access Code: [email or web address]  URL: [email or web address]  Date: 12/05/2023  Prepared by: Roby Nayak    Exercises  - Static Prone on Elbows  - 5-8 x daily - 7 x weekly - 1 sets - 2-3 reps - 30-60 hold  - Prone Press Up  - 5-8 x daily - 7 x weekly - 2-4 sets - 5 reps  - Seated Correct Posture     Assessment:    Had better response to REIL as compared to eval, RFIL produced/worsened left thigh. Pt responded a good response to repeated left SG as well.  Pt demo'd understanding of education and changes to HEP.     Plan:  Progress midline extension forces if NB/NW  Assess lateral procedures if worse         Roby Nayak, PT

## 2023-12-13 ENCOUNTER — TREATMENT (OUTPATIENT)
Dept: PHYSICAL THERAPY | Facility: CLINIC | Age: 63
End: 2023-12-13
Payer: COMMERCIAL

## 2023-12-13 DIAGNOSIS — M54.50 ACUTE BILATERAL LOW BACK PAIN WITHOUT SCIATICA: Primary | ICD-10-CM

## 2023-12-13 PROCEDURE — 97110 THERAPEUTIC EXERCISES: CPT | Mod: GP,CQ

## 2023-12-13 PROCEDURE — 97112 NEUROMUSCULAR REEDUCATION: CPT | Mod: CQ,GP

## 2023-12-13 NOTE — PROGRESS NOTES
"  Physical Therapy Treatment    Patient Name: Rose Grigsby  MRN: [id number]  Today's Date: 12/13/2023  Time Calculation  Start Time: 0827  Stop Time: 0911  Time Calculation (min): 44 min    Current Problem  1. Acute bilateral low back pain without sciatica  Follow Up In Physical Therapy          Insurance:  Number of Treatments Authorized: 3/MN.  Auth required, [employer], 80/20 coverage, ded = $2000 - met.            Subjective   General  General Comment: Patient reports continued pain reduction with lumbar extension. Noted some R thigh tightness/stiffness with hips right.    Performing HEP?: Yes - prone lying lumbar extension 4x/day    Precautions  Precautions  Precautions Comment: None (Reviewed medical health history form - RLM)  Pain       Objective   L/S Movement Loss - Nil/Min/Mod/Max limit or degrees   Flexion: nil   Extension: min    R SGIS: nil, tension R lateral thigh   L SGIS: nil       Therapeutic Exercise  Therapeutic Exercise Activity 1: prone lying x 1 mi n  Therapeutic Exercise Activity 2: PPU 2 x 10  Therapeutic Exercise Activity 3: R hip to wall side glide 2 x 10  Therapeutic Exercise Activity 4: HL TVA 5\" x 10  Therapeutic Exercise Activity 5: TVA with ADD ball squeeze 5\" x 2 min  Therapeutic Exercise Activity 6: TVA with RTB 5\" x 2 min  Therapeutic Exercise Activity 7: TVA with HL march x 2 min  Therapeutic Exercise Activity 8: prone lying x 1 min  Therapeutic Exercise Activity 9: PPU x 10  Therapeutic Exercise Activity 10: seated TVA with and without finger tip assist x 10  Therapeutic Exercise Activity 11: TVA with sit to stand transfer x 10  Therapeutic Exercise Activity 12: R SG on wall x 10    Balance/Neuromuscular Re-Education  Balance/Neuromuscular Re-Education Activity 1: NBOS on aires pad x 1 min  Balance/Neuromuscular Re-Education Activity 2: NBOS on airex with 5# KB waist pass 2 x 10  Balance/Neuromuscular Re-Education Activity 3: DLB on airex pad with alt foot tap on stool x 2 " min                          OP EDUCATION:  Outpatient Education  Education Comment: Access Code: [email or web address]  URL: [email or web address]  Date: 12/13/2023  Prepared by: Annie East    Exercises  - Right Standing Lateral Shift Correction at Wall - Hold  - 2 x daily - 7 x weekly - 10 reps  - Hooklying Clamshell with Resistance  - 1 x daily - 7 x weekly - 3 sets - 10 reps - 5 hold    Assessment:  PT Assessment  Assessment Comment: PAtient continues to respond well to lumbar extension.  Today's session foculed on reduction of R lateral thigh tightness and core activation.  Updated HEP.  Discussed TVA with ADLs.    Plan:  OP PT Plan  Number of Treatments Authorized: 3/MN.  Auth required, Yoly, 80/20 coverage, ded = $2000 - met.    Goals:       Jie East, PTA

## 2023-12-19 ENCOUNTER — TREATMENT (OUTPATIENT)
Dept: PHYSICAL THERAPY | Facility: CLINIC | Age: 63
End: 2023-12-19
Payer: COMMERCIAL

## 2023-12-19 DIAGNOSIS — M54.50 ACUTE BILATERAL LOW BACK PAIN WITHOUT SCIATICA: ICD-10-CM

## 2023-12-19 PROCEDURE — 97110 THERAPEUTIC EXERCISES: CPT | Mod: GP

## 2023-12-19 NOTE — PROGRESS NOTES
Physical Therapy Treatment    Patient Name: Rose Grigsby  MRN: [id number]  Today's Date: 12/19/2023       Current Problem  1. Acute bilateral low back pain without sciatica  Follow Up In Physical Therapy          Insurance:  Number of Treatments Authorized: 4/8.  Auth required, [place], 80/20 coverage, ded = $2000 - met.            Subjective   General    Had been pretty much pain free until this Saturday. Was cleaning the bathtubs in the AM and had a lot of pain later that day, 3-4 in the afternoon.  Took it easy on Sunday/Monday and did a lot of NIKUNJ, which helped.  Started to feel better Monday/Tuesday.  Feels like she is over 90% progressed towards goals.    Performing HEP?: Yes - NIKUNJ    Precautions  Precautions  Precautions Comment: None (Reviewed medical health history form - RLM)  Pain   6/10 LBP     Objective     Treatments:  Sitting with roll x 2 min   NIKUNJ x 10 - abolish, Better  NIKUNJ over plinth x 10   BESS  1 min x 2   REIL 5 x 2   HL alt A/P pelvic tilts x 2 minute  HL R/L rot with TVA x 2 min  HL alt R/L hip ER green band and TVA x 2 minute  HL ball squeeze with TVA, 5 second hold x 2 minutes  NIKUNJ over plinth 10 x 2     OP EDUCATION:   Pt was encouraged to perform NIKUNJ as often as possible throughout the day until sx return to baseline prior to recent flare.  Also, was instructed to perform NIKUNJ as a preventative measure prior to repeated/sustained flexion activities.    Assessment:   Pt managed her recent flare in sx with with ext procedures. NIKUNJ provides lasting reduction in sx and she is able to consistently perform this over REIL. She demo'd understanding of eduction/instruction above. Had good tolerance to unloaded core stabilization exercises, min increase in sx that quickly resolved with rest/ext procedures.     Plan:  Continue with PT x 1 a week for 3-4 more weeks.    Initiated ROF when sx resolve for >/= 4-5 days.   OP PT Plan  Number of Treatments Authorized: 4/8.  Auth required,  [employer], 80/20 coverage, ded = $2000 - met.      Roby Nayak, PT

## 2023-12-26 ENCOUNTER — TREATMENT (OUTPATIENT)
Dept: PHYSICAL THERAPY | Facility: CLINIC | Age: 63
End: 2023-12-26
Payer: COMMERCIAL

## 2023-12-26 DIAGNOSIS — M54.50 ACUTE BILATERAL LOW BACK PAIN WITHOUT SCIATICA: Primary | ICD-10-CM

## 2023-12-26 PROCEDURE — 97110 THERAPEUTIC EXERCISES: CPT | Mod: GP,CQ

## 2023-12-26 PROCEDURE — 97140 MANUAL THERAPY 1/> REGIONS: CPT | Mod: GP,CQ

## 2023-12-26 NOTE — PROGRESS NOTES
Physical Therapy Treatment    Patient Name: Rose Grigsby  MRN: [id number]  Today's Date: 12/26/2023  Time Calculation  Start Time: 0742  Stop Time: 0826  Time Calculation (min): 44 min    Current Problem  1. Acute bilateral low back pain without sciatica  Follow Up In Physical Therapy          Insurance:  Number of Treatments Authorized: 5/8.  Auth required, [place], 80/20 coverage, ded = $2000 - met.            Subjective   General  General Comment: Patient feels pretty good this morning.  Cleaning the shower stalls at home seems to cause pain and concern.  Vaccuuming technique has been working well.  Notes some restriction with trunk rotation - notices when backing up the car.    Performing HEP?: Yes    Precautions  Precautions  Precautions Comment: None (Reviewed medical health history form - RLM)  Pain       Objective   L hip mobility diminished compared to the R    Treatments:      Therapeutic Exercise  Therapeutic Exercise Activity 1: sitting with lumbar roll x 2 min  Therapeutic Exercise Activity 2: NIKUNJ over plinth x 10  Therapeutic Exercise Activity 3: LTR x 20  Therapeutic Exercise Activity 4: R/L NWB HF stretch with assist x 2 min ea  Therapeutic Exercise Activity 5: R/L QS w/ SLR 2 x 10 ea  Therapeutic Exercise Activity 6: SL clamshell 2 x 10 ea R/L  Therapeutic Exercise Activity 7: BESS 2 x 1 min  Therapeutic Exercise Activity 8: REIL 2 x 5  Therapeutic Exercise Activity 9: LTR x 20         Manual Therapy  Manual Therapy Activity 1: PROM L hip  Manual Therapy Activity 2: gentle L hip inf mobs  Manual Therapy Activity 3: stretch L HF, ADD, piriformis    Therapeutic Activity  Therapeutic Activity 1: 1/4 kneeling on airex pad to simulate cleaning shower stalls.                OP EDUCATION:       Assessment:  PT Assessment  Assessment Comment: Patient continues to have a favorable response to lumbar extension.  Added LTR 1x/day.  Discussed back protection when cleaning the shower. L hip mibility is  limited.    Plan:  OP PT Plan  Number of Treatments Authorized: 5/8.  Auth required, [place], 80/20 coverage, ded = $2000 - met.    Goals:       Jie East, PTA

## 2024-01-04 ENCOUNTER — TREATMENT (OUTPATIENT)
Dept: PHYSICAL THERAPY | Facility: CLINIC | Age: 64
End: 2024-01-04
Payer: COMMERCIAL

## 2024-01-04 DIAGNOSIS — M54.50 ACUTE BILATERAL LOW BACK PAIN WITHOUT SCIATICA: Primary | ICD-10-CM

## 2024-01-04 PROCEDURE — 97110 THERAPEUTIC EXERCISES: CPT | Mod: GP,CQ

## 2024-01-04 PROCEDURE — 97112 NEUROMUSCULAR REEDUCATION: CPT | Mod: CQ,GP

## 2024-01-04 NOTE — PROGRESS NOTES
"  Physical Therapy Treatment    Patient Name: Rose Grigsby  MRN: [id number]  Today's Date: 1/4/2024  Time Calculation  Start Time: 1015  Stop Time: 1100  Time Calculation (min): 45 min    Current Problem  1. Acute bilateral low back pain without sciatica  Follow Up In Physical Therapy          Insurance:  Number of Treatments Authorized: 6/8.  Auth required, [place], 80/20 coverage, ded = $2000            Subjective   General  General Comment: It's been a good week. Stuck with my HEP regularly.  She has been relatively pain free. Has been able to successfully use the 1/4 kneeling position at home.    Performing HEP?: Yes    Precautions  Precautions  Precautions Comment: None (Reviewed medical health history form - RLM)  Pain       Objective   Upright posture with non antalgic gait    Treatments:    Therapeutic Exercise  Therapeutic Exercise Activity 1: SciFit Man L3 x 5 min  Therapeutic Exercise Activity 2: NIKUNJ over HS curl bar x 20  Therapeutic Exercise Activity 3: standing at // bars - R/L hip ABD x 10 ea  Therapeutic Exercise Activity 4: standing at // bars R/L hip EXT x 10 ea  Therapeutic Exercise Activity 5: NIKUNJ over HS curl bar x 10  Therapeutic Exercise Activity 6: seated R/L HS stretch with foot on stool 2 x 1 min ea  Therapeutic Exercise Activity 7: sit to stand from chair, holding 4# ball x 12  Therapeutic Exercise Activity 8: LTR with feet wide x 2 min  Therapeutic Exercise Activity 9: R/L QS w/ SLR 2 x 10 ea  Therapeutic Exercise Activity 10: NIKUNJ over plinth x 20    Balance/Neuromuscular Re-Education  Balance/Neuromuscular Re-Education Activity 1: alt foot tap on 8\" step w/o UE x 1 min  Balance/Neuromuscular Re-Education Activity 2: DLB on airex pad with alt foot tap on 8\" step without UE x 1 min  Balance/Neuromuscular Re-Education Activity 3: dynamics - march, butt kick, R/L side stepping 2 x 40' ea    Manual Therapy  Manual Therapy Activity 1: PROM L hip  Manual Therapy Activity 2: L lat " quad/ITB sweeps                     OP EDUCATION:  Outpatient Education  Education Comment: Access Code: [email or web address]  URL: [email or web address]  Date: 01/04/2024  Prepared by: Annie East    Exercises  - Seated Hamstring Stretch  - 1 x daily - 7 x weekly - 2 reps - 1 min hold  - Small Range Straight Leg Raise  - 1 x daily - 7 x weekly - 2 sets - 10 reps    Assessment:  PT Assessment  Assessment Comment: Patient arrived to the clinic, sx free.  She had a good week and has been compliany with her HEP.  Added some gentle strengthening and stabilization ex today.  Patient fatigues easily.  Warned post session soreness    Plan:  OP PT Plan  Number of Treatments Authorized: 6/8.  [employer]oly [place], 80/20 coverage, ded = $2000    Goals:       Jie East, PTA

## 2024-01-11 ENCOUNTER — TREATMENT (OUTPATIENT)
Dept: PHYSICAL THERAPY | Facility: CLINIC | Age: 64
End: 2024-01-11
Payer: COMMERCIAL

## 2024-01-11 DIAGNOSIS — M54.50 ACUTE BILATERAL LOW BACK PAIN WITHOUT SCIATICA: ICD-10-CM

## 2024-01-11 PROCEDURE — 97110 THERAPEUTIC EXERCISES: CPT | Mod: GP

## 2024-01-11 NOTE — PROGRESS NOTES
Physical Therapy Treatment    Patient Name: Rose Grigsby  MRN: [id number]  Today's Date: 1/11/2024       Current Problem  1. Acute bilateral low back pain without sciatica  Follow Up In Physical Therapy          Insurance:  Number of Treatments Authorized: 2 (7/8) Auth cosme, [employer], 80/20 coverage, ded = $2000            Subjective   General    Things are good today.  Been feeling a lot better in general.  No issues over the past few week.  Feels like the episode of pain has resolved.     Performing HEP?: Yes    Precautions  Precautions  Precautions Comment: None (Reviewed medical health history form - RLM)  Pain       Objective   Movement Loss - Nil/Min/Mod/Max limit or degrees  L/S Flexion: min  L/S Extension: min  L/S R SGIS: nil  L/S L SGIS: min    Treatments:    Therapeutic Exercise  Sitting with roll x 2 min   NIKUNJ over plinth x 10  RFISitting x 10   NIKUNJ over plinth x 10  RFIS x 10  NIKUNJ over plinth x 10  dynamics - march, butt kick, Tin soldiers x 2, Fwd lunge x 2, R/L lateral lunge x 40' ea  R/L lateral steps with yellow loop x 40ft each direction  F/B diagonal steps with yellow loop x 40ft each direction  F/B monster walks with yellow loop x 40ft each direction  TG L7 squats 1 min x 3  At // bars:  R, L HS stretch on step x 1 min each LE   R, L HF stretch x 1 min each LE  B G/S stretch on incline board x 1 min     OP EDUCATION:  Educated on long temr management strategy with HEP    Access Code: [email or web address]  URL: [email or web address]  Date: 01/11/2024  Prepared by: Roby Nayak  Exercises  - Seated Correct Posture   - Standing Lumbar Extension  - 2-3 x daily - 7 x weekly - 1 sets - 10 -20 reps  - Standing Lumbar Extension  - 1 x daily - 7 x weekly - 1 sets - 10 reps  - Seated Lumbar Flexion Stretch  - 1 x daily - 7 x weekly - 1 sets - 10 reps  - Standing Lumbar Extension  - 1 x daily - 7 x weekly - 1 sets - 10 reps    Assessment:   Pt has made great progress since initial  eval per subjective report.  Completed ROF exercises without ROM loss or pain. Pt demo'd good understanding of long term management with HEP.  Tolerated therex well ,no pain just rest breaks secondary to fatigue.     Plan:  OP PT Plan  Number of Treatments Authorized: 2 (7/8) Auth required, [place], 80/20 coverage, ded = $2000  Discharge from PT    Roby Nayak, PT

## 2024-01-18 ENCOUNTER — APPOINTMENT (OUTPATIENT)
Dept: PHYSICAL THERAPY | Facility: CLINIC | Age: 64
End: 2024-01-18
Payer: COMMERCIAL

## 2024-01-25 ENCOUNTER — APPOINTMENT (OUTPATIENT)
Dept: PHYSICAL THERAPY | Facility: CLINIC | Age: 64
End: 2024-01-25
Payer: COMMERCIAL

## 2024-02-20 ENCOUNTER — HOSPITAL ENCOUNTER (OUTPATIENT)
Dept: RADIOLOGY | Facility: CLINIC | Age: 64
Discharge: HOME | End: 2024-02-20
Payer: COMMERCIAL

## 2024-02-20 VITALS — BODY MASS INDEX: 25.95 KG/M2 | HEIGHT: 64 IN | WEIGHT: 152 LBS

## 2024-02-20 DIAGNOSIS — Z12.31 ENCOUNTER FOR SCREENING MAMMOGRAM FOR MALIGNANT NEOPLASM OF BREAST: ICD-10-CM

## 2024-02-20 PROCEDURE — 77067 SCR MAMMO BI INCL CAD: CPT

## 2024-04-05 ENCOUNTER — LAB (OUTPATIENT)
Dept: LAB | Facility: LAB | Age: 64
End: 2024-04-05
Payer: COMMERCIAL

## 2024-04-05 DIAGNOSIS — E03.8 OTHER SPECIFIED HYPOTHYROIDISM: ICD-10-CM

## 2024-04-05 DIAGNOSIS — E78.00 HYPERCHOLESTEROLEMIA: ICD-10-CM

## 2024-04-05 LAB
ALBUMIN SERPL BCP-MCNC: 4.5 G/DL (ref 3.4–5)
ALP SERPL-CCNC: 62 U/L (ref 33–136)
ALT SERPL W P-5'-P-CCNC: 42 U/L (ref 7–45)
ANION GAP SERPL CALC-SCNC: 11 MMOL/L (ref 10–20)
AST SERPL W P-5'-P-CCNC: 40 U/L (ref 9–39)
BILIRUB SERPL-MCNC: 1.5 MG/DL (ref 0–1.2)
BUN SERPL-MCNC: 13 MG/DL (ref 6–23)
CALCIUM SERPL-MCNC: 9.6 MG/DL (ref 8.6–10.6)
CHLORIDE SERPL-SCNC: 103 MMOL/L (ref 98–107)
CHOLEST SERPL-MCNC: 162 MG/DL (ref 0–199)
CHOLESTEROL/HDL RATIO: 2.3
CO2 SERPL-SCNC: 31 MMOL/L (ref 21–32)
CREAT SERPL-MCNC: 0.69 MG/DL (ref 0.5–1.05)
EGFRCR SERPLBLD CKD-EPI 2021: >90 ML/MIN/1.73M*2
ERYTHROCYTE [DISTWIDTH] IN BLOOD BY AUTOMATED COUNT: 12.5 % (ref 11.5–14.5)
GLUCOSE SERPL-MCNC: 72 MG/DL (ref 74–99)
HCT VFR BLD AUTO: 39.7 % (ref 36–46)
HDLC SERPL-MCNC: 69.4 MG/DL
HGB BLD-MCNC: 13 G/DL (ref 12–16)
LDLC SERPL CALC-MCNC: 75 MG/DL
MCH RBC QN AUTO: 29.8 PG (ref 26–34)
MCHC RBC AUTO-ENTMCNC: 32.7 G/DL (ref 32–36)
MCV RBC AUTO: 91 FL (ref 80–100)
NON HDL CHOLESTEROL: 93 MG/DL (ref 0–149)
NRBC BLD-RTO: 0 /100 WBCS (ref 0–0)
PLATELET # BLD AUTO: 268 X10*3/UL (ref 150–450)
POTASSIUM SERPL-SCNC: 4.1 MMOL/L (ref 3.5–5.3)
PROT SERPL-MCNC: 6.8 G/DL (ref 6.4–8.2)
RBC # BLD AUTO: 4.36 X10*6/UL (ref 4–5.2)
SODIUM SERPL-SCNC: 141 MMOL/L (ref 136–145)
TRIGL SERPL-MCNC: 87 MG/DL (ref 0–149)
TSH SERPL-ACNC: 3.03 MIU/L (ref 0.44–3.98)
VLDL: 17 MG/DL (ref 0–40)
WBC # BLD AUTO: 5.3 X10*3/UL (ref 4.4–11.3)

## 2024-04-05 PROCEDURE — 85027 COMPLETE CBC AUTOMATED: CPT

## 2024-04-05 PROCEDURE — 36415 COLL VENOUS BLD VENIPUNCTURE: CPT

## 2024-04-05 PROCEDURE — 80053 COMPREHEN METABOLIC PANEL: CPT

## 2024-04-05 PROCEDURE — 84443 ASSAY THYROID STIM HORMONE: CPT

## 2024-04-05 PROCEDURE — 80061 LIPID PANEL: CPT

## 2024-04-09 ENCOUNTER — OFFICE VISIT (OUTPATIENT)
Dept: PRIMARY CARE | Facility: CLINIC | Age: 64
End: 2024-04-09
Payer: COMMERCIAL

## 2024-04-09 VITALS
BODY MASS INDEX: 26.63 KG/M2 | WEIGHT: 156 LBS | SYSTOLIC BLOOD PRESSURE: 134 MMHG | DIASTOLIC BLOOD PRESSURE: 66 MMHG | HEIGHT: 64 IN

## 2024-04-09 DIAGNOSIS — L08.9 SKIN INFLAMMATION: ICD-10-CM

## 2024-04-09 DIAGNOSIS — E03.8 OTHER SPECIFIED HYPOTHYROIDISM: ICD-10-CM

## 2024-04-09 DIAGNOSIS — E78.5 HYPERLIPIDEMIA, UNSPECIFIED HYPERLIPIDEMIA TYPE: ICD-10-CM

## 2024-04-09 DIAGNOSIS — R74.8 ELEVATED LIVER ENZYMES: ICD-10-CM

## 2024-04-09 DIAGNOSIS — Z00.00 ANNUAL PHYSICAL EXAM: Primary | ICD-10-CM

## 2024-04-09 DIAGNOSIS — L30.9 ECZEMA, UNSPECIFIED TYPE: ICD-10-CM

## 2024-04-09 PROCEDURE — 99397 PER PM REEVAL EST PAT 65+ YR: CPT | Performed by: INTERNAL MEDICINE

## 2024-04-09 NOTE — PROGRESS NOTES
Subjective   Patient ID: Giselle Grigsby is a 63 y.o. female who presents for cpe.            Patient is here for physical  Follow-up on hypertension high cholesterol hypothyroidism  Did blood work    Past recap   patient is here for follow-up  needs refills on medication  Follow-up on hypothyroidism and high cholesterol  Wants refill on steroid cream for ear itch  Wants refill on steroid cream for dry skin which is prescribed by her dermatologist but she does not want to go them again  Colonoscopy in 2018 had diverticulosis       Social history ; non-smoker ,SOC ETOH  family history: Brother  at 42 of [place] father  at 69 of [place] Sister had colon cancer, maternal aunt liver disease  PMH : diverticular ds , benign breast tumor removed , high chol ,hypothyroidism , cholecystectomy,elevated calcium scoring    Patient is here for follow-up/physical  Follow-up on high cholesterol hypothyroidism  Patient is still trying to get her dental work bill which have been from her fall in the parking lot at [facility]. done with work   Complaining of left foot big toe pain she had surgery for ingrown toenail     Cardiologist had change her cholesterol medication to Crestor 40 mg a day but last time her liver function was high so we decided to cut down the dose to 20 mg a day     Patient is here for follow-up on blood work  Needs taking shingles vaccine  Needs refills on medications for high cholesterol and hypothyroidism  Did blood work  Had a fall in [employer] parking or below 3 front teeth        past recap  To reestablish PCP  Follow-up on hypothyroidism needs blood work results and renewal of medication  Her calcium cardiac scoring was high she is on statin she saw cardiologist and had stress test which was negative  Also due for vitamin D check she had low in the past  She has lost body weight secondary to dieting  She was told GYN Dr. Hernández  This history of diverticular disease  She has a daughter with spatial needs she  "takes care of her  Older Sis has precancerous polyp            Objective   /66   Ht 1.626 m (5' 4\")   Wt 70.8 kg (156 lb)   BMI 26.78 kg/m²     Physical Exam  Vitals reviewed.   Constitutional:       Appearance: Normal appearance.   HENT:      Head: Normocephalic and atraumatic.      Right Ear: Tympanic membrane, ear canal and external ear normal.      Left Ear: Tympanic membrane, ear canal and external ear normal.      Nose: Nose normal.      Mouth/Throat:      Pharynx: Oropharynx is clear.   Eyes:      Extraocular Movements: Extraocular movements intact.      Conjunctiva/sclera: Conjunctivae normal.      Pupils: Pupils are equal, round, and reactive to light.   Cardiovascular:      Rate and Rhythm: Normal rate and regular rhythm.      Pulses: Normal pulses.      Heart sounds: Normal heart sounds.   Pulmonary:      Effort: Pulmonary effort is normal.      Breath sounds: Normal breath sounds.   Abdominal:      General: Abdomen is flat. Bowel sounds are normal.      Palpations: Abdomen is soft.   Musculoskeletal:      Cervical back: Normal range of motion and neck supple.   Skin:     General: Skin is warm and dry.   Neurological:      General: No focal deficit present.      Mental Status: She is alert and oriented to person, place, and time.   Psychiatric:         Mood and Affect: Mood normal.         Assessment/Plan   Problem List Items Addressed This Visit          Cardiac and Vasculature    Hyperlipidemia    Relevant Medications    rosuvastatin (Crestor) 40 mg tablet    Other Relevant Orders    CBC    Comprehensive Metabolic Panel    Lipid Panel    Thyroid Stimulating Hormone       Endocrine/Metabolic    Hypothyroidism    Relevant Medications    Synthroid 100 mcg tablet    Other Relevant Orders    CBC    Comprehensive Metabolic Panel    Lipid Panel    Thyroid Stimulating Hormone       Skin    Eczema    Relevant Medications    hydrocortisone 2.5 % cream     Other Visit Diagnoses       Annual physical exam  "   -  Primary    Skin inflammation        Relevant Medications    mometasone (Elocon) 0.1 % cream    Elevated liver enzymes              Past recap  Blood work reviewed  #1 Hypothyroidism  Synthroid 100 Âµg  #2 hyperlipidemia  Cholesterol well controlled on 20 mg of Crestor  #3 Eczema  Steroid cream prescribed  #4  Dry skin   Refills given on steroid cream advised to keep skin moisturized   BMD shows mild osteopenia.  Discussed diet calcium and vitamin D supplement and exercise   follow-up blood work in 6 months  Colonoscopy was in 2018     11/9/2023  X-ray of the lumbosacral spine  UA shows blood  We will get CT of the abdomen pelvis to rule out kidney stone  X-ray of the back was reviewed  Bone is demineralized encourage patient to take vitamin D and calcium and do weightbearing exercises  Patient has moderate bony degenerative changes which could be causing the pain  Discussed stretching exercises  Treat with anti-inflammatory  Moderate atherosclerotic calcification  Patient had elevated CT cardiac scoring 2  She was never smoker  Discussed lifestyle modifications with exercise dietary controlled controlled sugar control cholesterol  Follow-up if not better    4/9/2024  Physical normal  Left hip limited range of motion  Possibly developing arthritis  Discussed range of motion exercises  Fungal rash in the toe webspace  Treat with antifungal  Encouraged to wear wider shoes as patient is developing calluses  Exercises discussed  Blood work reviewed  AST 40 total bilirubin 1.5  Low-fat diet  Discussed diet and exercise  Follow-up blood work in 6 months  Medications refilled

## 2024-04-10 RX ORDER — MOMETASONE FUROATE 1 MG/G
CREAM TOPICAL DAILY
Qty: 50 G | Refills: 1 | Status: SHIPPED | OUTPATIENT
Start: 2024-04-10

## 2024-04-10 RX ORDER — LEVOTHYROXINE SODIUM 100 UG/1
100 TABLET ORAL DAILY
Qty: 90 TABLET | Refills: 1 | Status: SHIPPED | OUTPATIENT
Start: 2024-04-10

## 2024-04-10 RX ORDER — HYDROCORTISONE 25 MG/G
CREAM TOPICAL 2 TIMES DAILY
Qty: 28 G | Refills: 1 | Status: SHIPPED | OUTPATIENT
Start: 2024-04-10

## 2024-04-10 RX ORDER — ROSUVASTATIN CALCIUM 40 MG/1
40 TABLET, COATED ORAL DAILY
Qty: 90 TABLET | Refills: 1 | Status: SHIPPED | OUTPATIENT
Start: 2024-04-10

## 2024-04-30 ENCOUNTER — TELEPHONE (OUTPATIENT)
Dept: PRIMARY CARE | Facility: CLINIC | Age: 64
End: 2024-04-30
Payer: COMMERCIAL

## 2024-04-30 NOTE — TELEPHONE ENCOUNTER
Patient called stated her  sees you and would like to transfer to you. She is currently seeing Dr. Soto but no longer what's to be a part of her practice. If you accept her as a NP she wouldn't need to be seen until Sept.

## 2024-08-19 NOTE — PROGRESS NOTES
"Subjective   Chief Complaint   Patient presents with    Welcome To Medicare     Patient is coming in today for her welcome to medicare exam.       Patient ID: Rose Grigsby \"Giselle\" is a 63 y.o. female who presents for Welcome To Medicare (Patient is coming in today for her welcome to medicare exam.).    HPI  Rose \"Giselle\" is a 62 yo female presenting as a new pt to [facility] care with a new PCP  Previous PCP: Dr. Soto; LOV 4/2024 (CPE)     Dx: HYPOTHYROIDISM, HLD, OSTEOPENIA     Pt reports doing well today  No acute c/o  Due for Welcome to Medicare     Pt had elevated calcium CT score (651) in 2019  Saw cardiology, Dr. Kovacs  Started on ASA and statin--> still taking both   Had Stress test in 11/2019---> normal   No issues/concerns per pt today     MAMM: UTD  COLON: DUE 2023  DEXA: 2022  PAP: 2022  FBW: DUE   VACCINES: needs Prevnar     No Known Allergies    Review of Systems  ROS was completed and all systems are negative with the exception of what was noted in the the HPI.       Objective   /78   Pulse 76   Ht 1.626 m (5' 4\")   Wt 73.7 kg (162 lb 6.4 oz)   SpO2 96%   BMI 27.88 kg/m²      Current Outpatient Medications   Medication Instructions    acetaminophen (Tylenol) 325 mg capsule oral    ammonium lactate (Amlactin) 12 % cream Topical, As needed    aspirin 81 mg EC tablet 1 tablet, oral, Daily    cholecalciferol (VITAMIN D-3) 50 mcg, oral, Daily    clotrimazole-betamethasone (Lotrisone) cream Clotrimazole-Betamethasone 1-0.05 % External Cream   Quantity: 15  Refills: 0        Start : 17-May-2013   Active    diclofenac sodium (VOLTAREN) 4 g, Topical, 4 times daily    fluticasone (Flonase) 50 mcg/actuation nasal spray 2 sprays, Each Nostril, Daily    hydrocortisone 2.5 % cream Topical, 2 times daily, Apply to affected areas 2-3 times daily    mometasone (Elocon) 0.1 % cream Topical, Daily    rosuvastatin (CRESTOR) 40 mg, oral, Daily    Synthroid 100 mcg, oral, Daily    white petrolatum-mineral oiL " (Tears Naturale PM) 94-3 % ophthalmic ointment 1 Application, Both Eyes         Physical Exam  Vitals reviewed.   Cardiovascular:      Pulses: Normal pulses.      Heart sounds: Normal heart sounds.   Pulmonary:      Effort: Pulmonary effort is normal.      Breath sounds: Normal breath sounds.   Neurological:      Mental Status: She is alert and oriented to person, place, and time.   Psychiatric:         Mood and Affect: Mood normal.         Assessment & Plan  Acquired hypothyroidism  Dx'd years ago  Rx Levothyroxine 100 mcg daily   Due for TSH   Orders:    TSH with reflex to Free T4 if abnormal; Future    Vitamin D deficiency  Does not take Vitamin D daily     Orders:    Vitamin D 25-Hydroxy,Total (for eval of Vitamin D levels); Future    Hypercholesterolemia  FLP done in April 2024---> 162/75/87    Orders:    Comprehensive Metabolic Panel; Future    Colon cancer screening    Orders:    Colonoscopy Screening; Average Risk Patient; Future    Elevated liver enzymes  CMP ordered     Orders:    Comprehensive Metabolic Panel; Future    Welcome to Medicare preventive visit  - Counseled on healthy diet and regular exercise  - Fall avoidance information provided  - Personalized prevention plan provided   - Mammogram UTD  - Colon ordered   - Vaccines: Prevnar given   - FBW ordered   - Pt agreeable to HIV/HEP C screening tests        Routine general medical examination at health care facility         Screening for cardiovascular condition    Orders:    ECG 12 lead    Need for vaccination  Prevnar 20 today    Orders:    Pneumococcal conjugate vaccine 20-valent IM    Asymptomatic menopausal state  Dexa ordered    Orders:    XR DEXA bone density; Future    Advanced directives, counseling/discussion  I spent > 16 minutes face to face with Rose Grigsby discussing his/her advance directives, including a Living Will, Healthcare POA as well as specific end of life choices and/or directives, and pt was provided with packet to  return next visit.    HCPOA: Spouse, Jack   Pt is Full Code         Screening for multiple conditions  Depression screening completed using PHQ-2 questions with results documented in the chart/encounter (15 minutes)  See rooming screening section for documentation and/or progress note for additional information.

## 2024-08-20 ENCOUNTER — APPOINTMENT (OUTPATIENT)
Dept: PRIMARY CARE | Facility: CLINIC | Age: 64
End: 2024-08-20
Payer: COMMERCIAL

## 2024-08-20 VITALS
HEIGHT: 64 IN | WEIGHT: 162.4 LBS | HEART RATE: 76 BPM | BODY MASS INDEX: 27.72 KG/M2 | DIASTOLIC BLOOD PRESSURE: 78 MMHG | OXYGEN SATURATION: 96 % | SYSTOLIC BLOOD PRESSURE: 127 MMHG

## 2024-08-20 DIAGNOSIS — E03.9 ACQUIRED HYPOTHYROIDISM: ICD-10-CM

## 2024-08-20 DIAGNOSIS — Z13.6 SCREENING FOR CARDIOVASCULAR CONDITION: ICD-10-CM

## 2024-08-20 DIAGNOSIS — Z12.11 COLON CANCER SCREENING: ICD-10-CM

## 2024-08-20 DIAGNOSIS — Z78.0 ASYMPTOMATIC MENOPAUSAL STATE: ICD-10-CM

## 2024-08-20 DIAGNOSIS — Z23 NEED FOR VACCINATION: ICD-10-CM

## 2024-08-20 DIAGNOSIS — E78.00 HYPERCHOLESTEROLEMIA: ICD-10-CM

## 2024-08-20 DIAGNOSIS — Z71.89 ADVANCED DIRECTIVES, COUNSELING/DISCUSSION: ICD-10-CM

## 2024-08-20 DIAGNOSIS — E55.9 VITAMIN D DEFICIENCY: ICD-10-CM

## 2024-08-20 DIAGNOSIS — Z00.00 WELCOME TO MEDICARE PREVENTIVE VISIT: Primary | ICD-10-CM

## 2024-08-20 DIAGNOSIS — Z00.00 ROUTINE GENERAL MEDICAL EXAMINATION AT HEALTH CARE FACILITY: ICD-10-CM

## 2024-08-20 DIAGNOSIS — R74.8 ELEVATED LIVER ENZYMES: ICD-10-CM

## 2024-08-20 DIAGNOSIS — Z13.89 SCREENING FOR MULTIPLE CONDITIONS: ICD-10-CM

## 2024-08-20 PROCEDURE — G0009 ADMIN PNEUMOCOCCAL VACCINE: HCPCS | Performed by: NURSE PRACTITIONER

## 2024-08-20 PROCEDURE — 1159F MED LIST DOCD IN RCRD: CPT | Performed by: NURSE PRACTITIONER

## 2024-08-20 PROCEDURE — 1160F RVW MEDS BY RX/DR IN RCRD: CPT | Performed by: NURSE PRACTITIONER

## 2024-08-20 PROCEDURE — 1036F TOBACCO NON-USER: CPT | Performed by: NURSE PRACTITIONER

## 2024-08-20 PROCEDURE — 99497 ADVNCD CARE PLAN 30 MIN: CPT | Performed by: NURSE PRACTITIONER

## 2024-08-20 PROCEDURE — 90677 PCV20 VACCINE IM: CPT | Performed by: NURSE PRACTITIONER

## 2024-08-20 PROCEDURE — 1158F ADVNC CARE PLAN TLK DOCD: CPT | Performed by: NURSE PRACTITIONER

## 2024-08-20 PROCEDURE — G0402 INITIAL PREVENTIVE EXAM: HCPCS | Performed by: NURSE PRACTITIONER

## 2024-08-20 PROCEDURE — 99213 OFFICE O/P EST LOW 20 MIN: CPT | Performed by: NURSE PRACTITIONER

## 2024-08-20 PROCEDURE — G0403 EKG FOR INITIAL PREVENT EXAM: HCPCS | Performed by: NURSE PRACTITIONER

## 2024-08-20 PROCEDURE — 3008F BODY MASS INDEX DOCD: CPT | Performed by: NURSE PRACTITIONER

## 2024-08-20 PROCEDURE — 1123F ACP DISCUSS/DSCN MKR DOCD: CPT | Performed by: NURSE PRACTITIONER

## 2024-08-20 PROCEDURE — 1170F FXNL STATUS ASSESSED: CPT | Performed by: NURSE PRACTITIONER

## 2024-08-20 ASSESSMENT — PATIENT HEALTH QUESTIONNAIRE - PHQ9
2. FEELING DOWN, DEPRESSED OR HOPELESS: NOT AT ALL
1. LITTLE INTEREST OR PLEASURE IN DOING THINGS: NOT AT ALL
SUM OF ALL RESPONSES TO PHQ9 QUESTIONS 1 AND 2: 0

## 2024-08-20 ASSESSMENT — ENCOUNTER SYMPTOMS
OCCASIONAL FEELINGS OF UNSTEADINESS: 0
LOSS OF SENSATION IN FEET: 0
DEPRESSION: 0

## 2024-08-20 ASSESSMENT — VISUAL ACUITY
OD_CC: 20/25
OS_CC: 20/25

## 2024-08-20 ASSESSMENT — ACTIVITIES OF DAILY LIVING (ADL)
GROCERY_SHOPPING: INDEPENDENT
BATHING: INDEPENDENT
TAKING_MEDICATION: INDEPENDENT
DOING_HOUSEWORK: INDEPENDENT
MANAGING_FINANCES: INDEPENDENT
DRESSING: INDEPENDENT

## 2024-08-20 NOTE — ASSESSMENT & PLAN NOTE
Depression screening completed using PHQ-2 questions with results documented in the chart/encounter (15 minutes)  See rooming screening section for documentation and/or progress note for additional information.

## 2024-08-20 NOTE — ASSESSMENT & PLAN NOTE
Dx'd years ago  Rx Levothyroxine 100 mcg daily   Due for TSH   Orders:    TSH with reflex to Free T4 if abnormal; Future

## 2024-08-20 NOTE — PATIENT INSTRUCTIONS
Thank you for seeing me today.  It was a pleasure to meet you!    Today we did your Welcome to Medicare Wellness Exam and discussed the following:     EKG---> NSR w/o ectopy     Schedule colonoscopy    Schedule DEXA     Prevnar 20 today    Lab work ordered today.  Please have your blood drawn in the next 1-2 weeks.  You need to be FASTING for 12 hours prior to blood draw.  You may only have water.  Please contact your insurance company to ask about the best location to get blood drawn.  We will contact you with the results of your blood work and any necessary adjustments  to your plan of care, if you do not hear from us within 3-5 days of having your blood drawn, please call the office at [phone].    For assistance with scheduling referrals or consultations, please call [phone] or [phone].    For laboratory, radiology, and other tests, please call [phone] ([phone] for pediatrics).   If you do not get results within 7-10 days, or you have any questions or concerns, please send a message, call the office ([phone]), or return to the office for a follow-up appointment.     For acute/sick visits, if you are unable to get an office visit, you can do a  On Demand Virtual Visit that is accessible via your My Chart account.  For emergencies, call 9-1-1 or go to the nearest Emergency Department.     Please schedule additional appointment(s) to address concern(s) not addressed today.    Please review prescription inserts and published information for possible adverse effects of all medications.     In general, results are discussed over the phone or via  [employer].     You can see your health information, review clinical summaries from office visits & test results online when you follow your health with MY  Chart, a personal health record.   To sign up go to [email or web address].   If you need assistance with signing up or trouble getting into your account call [employer] Patient Line 24/7  at [phone]     RTC ANNUALLY AND AS NEEDED

## 2024-08-20 NOTE — ASSESSMENT & PLAN NOTE
Does not take Vitamin D daily     Orders:    Vitamin D 25-Hydroxy,Total (for eval of Vitamin D levels); Future

## 2024-08-20 NOTE — ASSESSMENT & PLAN NOTE
I spent > 16 minutes face to face with Rose Grigsby discussing his/her advance directives, including a Living Will, Healthcare POA as well as specific end of life choices and/or directives, and pt was provided with packet to return next visit.    HCPOA: Spouse, Jack   Pt is Full Code

## 2024-08-23 ENCOUNTER — HOSPITAL ENCOUNTER (OUTPATIENT)
Dept: RADIOLOGY | Facility: CLINIC | Age: 64
Discharge: HOME | End: 2024-08-23
Payer: MEDICARE

## 2024-08-23 DIAGNOSIS — Z78.0 ASYMPTOMATIC MENOPAUSAL STATE: ICD-10-CM

## 2024-08-23 PROCEDURE — 77080 DXA BONE DENSITY AXIAL: CPT

## 2024-08-23 ASSESSMENT — LIFESTYLE VARIABLES
CURRENT_SMOKER: N
3_OR_MORE_DRINKS_PER_DAY: N

## 2024-08-26 ENCOUNTER — TELEMEDICINE (OUTPATIENT)
Dept: PRIMARY CARE | Facility: CLINIC | Age: 64
End: 2024-08-26
Payer: MEDICARE

## 2024-08-26 DIAGNOSIS — U07.1 COVID: Primary | ICD-10-CM

## 2024-08-26 PROCEDURE — 1036F TOBACCO NON-USER: CPT

## 2024-08-26 PROCEDURE — 1160F RVW MEDS BY RX/DR IN RCRD: CPT

## 2024-08-26 PROCEDURE — 99213 OFFICE O/P EST LOW 20 MIN: CPT

## 2024-08-26 PROCEDURE — 1123F ACP DISCUSS/DSCN MKR DOCD: CPT

## 2024-08-26 PROCEDURE — 1159F MED LIST DOCD IN RCRD: CPT

## 2024-08-26 RX ORDER — COVID-19 ANTIGEN TEST
1 KIT MISCELLANEOUS AS NEEDED
Qty: 1 EACH | Refills: 0 | Status: SHIPPED | OUTPATIENT
Start: 2024-08-26

## 2024-08-26 ASSESSMENT — ENCOUNTER SYMPTOMS
FEVER: 0
SINUS PAIN: 1
COUGH: 1
RHINORRHEA: 1
SORE THROAT: 0
SHORTNESS OF BREATH: 0
HEADACHES: 0
DIARRHEA: 1
ABDOMINAL PAIN: 0
CHILLS: 0

## 2024-08-26 NOTE — PROGRESS NOTES
Subjective   Patient ID: Giselle Grigsby is a 63 y.o. female who presents for COVID.     With patient's permission, this is a Telemedicine visit with video and audio. Provider located at office address. Patient located at their home address. All issues as documented below were discussed and addressed but limited physical exam was performed. If it was felt that the patient should be evaluated via face-to-face office appointment(s) they were directed to appropriate location.     URI   This is a new problem. The current episode started yesterday. The problem has been gradually worsening. There has been no fever. Associated symptoms include congestion, coughing (dry), diarrhea, rhinorrhea, sinus pain and sneezing. Pertinent negatives include no abdominal pain, chest pain, ear pain, headaches or sore throat. She has tried NSAIDs for the symptoms.   Daughter has COVID.     Review of Systems   Constitutional:  Negative for chills and fever.   HENT:  Positive for congestion, rhinorrhea, sinus pain and sneezing. Negative for ear pain and sore throat.    Respiratory:  Positive for cough (dry). Negative for shortness of breath.    Cardiovascular:  Negative for chest pain.   Gastrointestinal:  Positive for diarrhea. Negative for abdominal pain.   Neurological:  Negative for headaches.     Objective   There were no vitals taken for this visit.  GFR >90 (04/24).     Physical Exam  Not performed due to limitations virtual telemedicine encounter.     Assessment/Plan   Problem List Items Addressed This Visit    None  Visit Diagnoses         Codes    COVID    -  Primary  Acute.   Paxlovid as directed. Risks and benefits of medication discussed and prescribed.   OTC Tylenol/Ibuprofen as directed for sinus pain. OTC Flonase as directed for sinus congestion and ear pressure. OTC antihistamine as directed for sinus drainage. Increase fluids, rest, humidifier.   Discussed CDC isolation guidelines.   Follow up with PCP if symptoms do not  improve within 7-10 days, or sooner for worsening.  U07.1    Relevant Medications    nirmatrelvir-ritonavir (PAXLOVID) 300 mg (150 mg x 2)-100 mg tablet therapy pack    COVID-19 antigen test (COVID-19 At-Home Test) kit

## 2024-09-03 DIAGNOSIS — E78.5 HYPERLIPIDEMIA, UNSPECIFIED HYPERLIPIDEMIA TYPE: ICD-10-CM

## 2024-09-03 PROBLEM — M85.89 OSTEOPENIA OF MULTIPLE SITES: Status: ACTIVE | Noted: 2024-09-03

## 2024-09-09 DIAGNOSIS — L08.9 SKIN INFLAMMATION: ICD-10-CM

## 2024-09-09 DIAGNOSIS — E78.5 HYPERLIPIDEMIA, UNSPECIFIED HYPERLIPIDEMIA TYPE: ICD-10-CM

## 2024-09-09 DIAGNOSIS — E03.8 OTHER SPECIFIED HYPOTHYROIDISM: ICD-10-CM

## 2024-09-09 RX ORDER — MOMETASONE FUROATE 1 MG/G
CREAM TOPICAL DAILY
Qty: 50 G | Refills: 1 | Status: SHIPPED | OUTPATIENT
Start: 2024-09-09

## 2024-09-09 RX ORDER — LEVOTHYROXINE SODIUM 100 UG/1
100 TABLET ORAL DAILY
Qty: 90 TABLET | Refills: 0 | Status: SHIPPED | OUTPATIENT
Start: 2024-09-09

## 2024-09-09 RX ORDER — ROSUVASTATIN CALCIUM 40 MG/1
20 TABLET, COATED ORAL DAILY
Qty: 90 TABLET | Refills: 0 | Status: SHIPPED | OUTPATIENT
Start: 2024-09-09

## 2024-09-09 RX ORDER — ROSUVASTATIN CALCIUM 40 MG/1
40 TABLET, COATED ORAL DAILY
Qty: 90 TABLET | Refills: 1 | OUTPATIENT
Start: 2024-09-09

## 2024-09-18 ENCOUNTER — LAB (OUTPATIENT)
Dept: LAB | Facility: LAB | Age: 64
End: 2024-09-18
Payer: MEDICARE

## 2024-09-18 DIAGNOSIS — R74.8 ELEVATED LIVER ENZYMES: ICD-10-CM

## 2024-09-18 DIAGNOSIS — E78.00 HYPERCHOLESTEROLEMIA: ICD-10-CM

## 2024-09-18 DIAGNOSIS — E55.9 VITAMIN D DEFICIENCY: ICD-10-CM

## 2024-09-18 DIAGNOSIS — E03.9 ACQUIRED HYPOTHYROIDISM: ICD-10-CM

## 2024-09-18 LAB
25(OH)D3 SERPL-MCNC: 44 NG/ML (ref 30–100)
ALBUMIN SERPL BCP-MCNC: 4.6 G/DL (ref 3.4–5)
ALP SERPL-CCNC: 58 U/L (ref 33–136)
ALT SERPL W P-5'-P-CCNC: 26 U/L (ref 7–45)
ANION GAP SERPL CALC-SCNC: 9 MMOL/L (ref 10–20)
AST SERPL W P-5'-P-CCNC: 26 U/L (ref 9–39)
BILIRUB SERPL-MCNC: 0.9 MG/DL (ref 0–1.2)
BUN SERPL-MCNC: 11 MG/DL (ref 6–23)
CALCIUM SERPL-MCNC: 9.3 MG/DL (ref 8.6–10.3)
CHLORIDE SERPL-SCNC: 104 MMOL/L (ref 98–107)
CO2 SERPL-SCNC: 30 MMOL/L (ref 21–32)
CREAT SERPL-MCNC: 0.76 MG/DL (ref 0.5–1.05)
EGFRCR SERPLBLD CKD-EPI 2021: 87 ML/MIN/1.73M*2
GLUCOSE SERPL-MCNC: 87 MG/DL (ref 74–99)
POTASSIUM SERPL-SCNC: 4.2 MMOL/L (ref 3.5–5.3)
PROT SERPL-MCNC: 6.7 G/DL (ref 6.4–8.2)
SODIUM SERPL-SCNC: 139 MMOL/L (ref 136–145)
T4 FREE SERPL-MCNC: 1.04 NG/DL (ref 0.61–1.12)
TSH SERPL-ACNC: 8.65 MIU/L (ref 0.44–3.98)

## 2024-09-18 PROCEDURE — 80053 COMPREHEN METABOLIC PANEL: CPT

## 2024-09-18 PROCEDURE — 36415 COLL VENOUS BLD VENIPUNCTURE: CPT

## 2024-09-18 PROCEDURE — 84439 ASSAY OF FREE THYROXINE: CPT

## 2024-09-18 PROCEDURE — 84443 ASSAY THYROID STIM HORMONE: CPT

## 2024-09-18 PROCEDURE — 82306 VITAMIN D 25 HYDROXY: CPT

## 2024-09-20 ENCOUNTER — TELEPHONE (OUTPATIENT)
Dept: PRIMARY CARE | Facility: CLINIC | Age: 64
End: 2024-09-20
Payer: MEDICARE

## 2024-09-20 DIAGNOSIS — E03.9 ACQUIRED HYPOTHYROIDISM: Primary | ICD-10-CM

## 2024-09-20 NOTE — TELEPHONE ENCOUNTER
Spoke with pt via phone regarding her elevated TSH  She has no c/o with it being > 8  Pt had Covid 2 weeks ago  Has been taking Synthroid as prescribed  Will recheck in 3 months  Pt agrees with POC at this time

## 2025-02-12 DIAGNOSIS — E03.8 OTHER SPECIFIED HYPOTHYROIDISM: ICD-10-CM

## 2025-02-12 LAB — TSH SERPL-ACNC: 2.23 MIU/L (ref 0.4–4.5)

## 2025-02-12 RX ORDER — LEVOTHYROXINE SODIUM 100 UG/1
100 TABLET ORAL DAILY
Qty: 90 TABLET | Refills: 3 | Status: SHIPPED | OUTPATIENT
Start: 2025-02-12

## 2025-02-12 NOTE — RESULT ENCOUNTER NOTE
"Rose Grigsby \"Giselle\"    I have reviewed your thyroid results and you are within therapeutic range. I have sent in your refill for the year.     If you have any questions, please feel free to call my office.    Take Care,   Kari   "

## 2025-04-11 ENCOUNTER — HOSPITAL ENCOUNTER (OUTPATIENT)
Dept: RADIOLOGY | Facility: CLINIC | Age: 65
Discharge: HOME | End: 2025-04-11
Payer: MEDICARE

## 2025-04-11 VITALS — BODY MASS INDEX: 27.66 KG/M2 | WEIGHT: 162 LBS | HEIGHT: 64 IN

## 2025-04-11 DIAGNOSIS — Z12.31 ENCOUNTER FOR SCREENING MAMMOGRAM FOR MALIGNANT NEOPLASM OF BREAST: ICD-10-CM

## 2025-04-11 PROCEDURE — 77067 SCR MAMMO BI INCL CAD: CPT | Performed by: RADIOLOGY

## 2025-04-11 PROCEDURE — 77063 BREAST TOMOSYNTHESIS BI: CPT

## 2025-04-11 PROCEDURE — 77063 BREAST TOMOSYNTHESIS BI: CPT | Performed by: RADIOLOGY

## 2025-04-13 NOTE — PROGRESS NOTES
"Subjective   Reason for Visit: Rose Grigsby is an 64 y.o. female here for a Medicare Wellness visit.     Past Medical, Surgical, and Family History reviewed and updated in chart.    Reviewed all medications by prescribing practitioner or clinical pharmacist (such as prescriptions, OTCs, herbal therapies and supplements) and documented in the medical record.    HPI  Rose \"Giselle\" is a 65 yo female presenting today for AWV   LOV: AUG 2024    Dx: HYPOTHYROIDISM, HLD, OSTEOPENIA     Current Providers  Specialists: I have reviewed specialist-related care of the patient in the medical record.   GYN: ELMER  POD: BOTEK     Patient states to be in usual state of health without any recent illnesses, hospitalizations, and no current or remote infections.     Pt had elevated calcium CT score (651) in 2019  Saw cardiology, Dr. Kovacs  Started on ASA and statin----> still taking both   Had Stress test in 11/2019---> normal   No issues/concerns per pt today     Pt had Mammogram per GYN last week    Pt would like to be tested for Measles protection     Pt is due for colonoscopy, will schedule with GI       #HYPOTHYROIDISM  Dx'd @ 1995  Rx levothyroxine 100 mcg daily   Last TSH=2.23 Feb 2025    #OSTEOPENIA  Dx'd 2017  DEXA: 2024  Takes Vitamin D daily  Consumes calcium daily   Walks regularly       No Known Allergies    Patient Care Team:  TK Be as PCP - General (Family Medicine)  TK Be as PCP - [employer] PCP       Review of Systems  ROS was completed and all systems are negative with the exception of what was noted in the the HPI.       Objective   Vitals:  /83   Pulse 76   Ht 1.626 m (5' 4\")   Wt 76.6 kg (168 lb 12.8 oz)   SpO2 94%   BMI 28.97 kg/m²       Past Surgical History:   Procedure Laterality Date    BREAST BIOPSY Bilateral 1996    bilateral biopsy benign  1996    COLONOSCOPY      COLONOSCOPY  04/11/2014    Complete Colonoscopy    OTHER SURGICAL " HISTORY  04/11/2014    Partial Permanent Excision Nail, Matrix Left First Toe    OTHER SURGICAL HISTORY  04/11/2014    Partial Permanent Excision Nail, Matrix Right First Toe       Current Outpatient Medications   Medication Instructions    acetaminophen (Tylenol) 325 mg capsule Take by mouth.    ammonium lactate (Amlactin) 12 % cream As needed    aspirin 81 mg EC tablet 1 tablet, Daily    cholecalciferol (VITAMIN D-3) 50 mcg, Daily    clotrimazole-betamethasone (Lotrisone) cream Clotrimazole-Betamethasone 1-0.05 % External Cream   Quantity: 15  Refills: 0        Start : 17-May-2013   Active    COVID-19 antigen test (COVID-19 At-Home Test) kit 1 kit, miscellaneous, As needed    diclofenac sodium (VOLTAREN) 4 g, 4 times daily    fluocinonide (Lidex) 0.05 % external solution INSTILL 3 DROPS IN AFFECTED EAR TWICE DAILY X 3 DAYS AND AS NEEDED    fluticasone (Flonase) 50 mcg/actuation nasal spray 2 sprays, Daily    hydrocortisone 2.5 % cream Topical, 2 times daily, Apply to affected areas 2-3 times daily    rosuvastatin (CRESTOR) 20 mg, oral, Daily    Synthroid 100 mcg, oral, Daily    white petrolatum-mineral oiL (Tears Naturale PM) 94-3 % ophthalmic ointment 1 Application         Physical Exam  Vitals reviewed.   HENT:      Left Ear: Tympanic membrane normal.      Ears:      Comments: Mild dry skin noted in RIGHT ear canal      Mouth/Throat:      Mouth: Mucous membranes are moist.   Eyes:      Conjunctiva/sclera: Conjunctivae normal.   Cardiovascular:      Pulses: Normal pulses.      Heart sounds: Normal heart sounds.   Pulmonary:      Effort: Pulmonary effort is normal.      Breath sounds: Normal breath sounds.   Musculoskeletal:         General: Normal range of motion.   Skin:     General: Skin is warm and dry.   Neurological:      Mental Status: She is alert and oriented to person, place, and time.   Psychiatric:         Mood and Affect: Mood normal.       Assessment & Plan  Medicare annual wellness visit, initial  -  Counseled on healthy diet and regular exercise  - Fall avoidance information provided  - Personalized prevention plan provided   - Mammogram UTD  - Colon and PAP are UTD  - Vaccines UTD   - FBW ordered         Pruritus of external auditory canal  Begin Fluocinonide drops in RIGHT ear   Orders:    fluocinonide (Lidex) 0.05 % external solution; INSTILL 3 DROPS IN AFFECTED EAR TWICE DAILY X 3 DAYS AND AS NEEDED    Mixed hyperlipidemia  Continue Rosuvastatin 20 mg daily   Orders:    rosuvastatin (Crestor) 20 mg tablet; Take 1 tablet (20 mg) by mouth once daily.    Hypercholesterolemia    Orders:    Lipid Panel; Future    Advanced directives, counseling/discussion  I spent > 16 minutes face to face with Rose ANGELICA Grigsby discussing his/her advance directives, including a Living Will, Healthcare POA as well as specific end of life choices and/or directives, and pt was provided with packet to return next visit.    HCPOA: Spouse   Pt is Full Code         Screening for multiple conditions  Depression screening completed using PHQ-2 questions with results documented in the chart/encounter (15 minutes)  See rooming screening section for documentation and/or progress note for additional information.         Measles, mumps, rubella (MMR) vaccination status unknown    Orders:    Rubeola Antibody, IgG; Future    Routine general medical examination at health care facility    Orders:    1 Year Follow Up In Primary Care - Wellness Exam; Future

## 2025-04-15 ENCOUNTER — APPOINTMENT (OUTPATIENT)
Dept: PRIMARY CARE | Facility: CLINIC | Age: 65
End: 2025-04-15
Payer: MEDICARE

## 2025-04-15 VITALS
HEART RATE: 76 BPM | HEIGHT: 64 IN | DIASTOLIC BLOOD PRESSURE: 83 MMHG | OXYGEN SATURATION: 94 % | WEIGHT: 168.8 LBS | SYSTOLIC BLOOD PRESSURE: 126 MMHG | BODY MASS INDEX: 28.82 KG/M2

## 2025-04-15 DIAGNOSIS — Z00.00 ROUTINE GENERAL MEDICAL EXAMINATION AT HEALTH CARE FACILITY: ICD-10-CM

## 2025-04-15 DIAGNOSIS — E78.2 MIXED HYPERLIPIDEMIA: ICD-10-CM

## 2025-04-15 DIAGNOSIS — Z13.89 SCREENING FOR MULTIPLE CONDITIONS: ICD-10-CM

## 2025-04-15 DIAGNOSIS — Z71.89 ADVANCED DIRECTIVES, COUNSELING/DISCUSSION: ICD-10-CM

## 2025-04-15 DIAGNOSIS — L29.9 PRURITUS OF EXTERNAL AUDITORY CANAL: ICD-10-CM

## 2025-04-15 DIAGNOSIS — Z78.9 MEASLES, MUMPS, RUBELLA (MMR) VACCINATION STATUS UNKNOWN: ICD-10-CM

## 2025-04-15 DIAGNOSIS — E78.00 HYPERCHOLESTEROLEMIA: ICD-10-CM

## 2025-04-15 DIAGNOSIS — Z00.00 MEDICARE ANNUAL WELLNESS VISIT, INITIAL: Primary | ICD-10-CM

## 2025-04-15 PROCEDURE — G2211 COMPLEX E/M VISIT ADD ON: HCPCS | Performed by: NURSE PRACTITIONER

## 2025-04-15 PROCEDURE — 1160F RVW MEDS BY RX/DR IN RCRD: CPT | Performed by: NURSE PRACTITIONER

## 2025-04-15 PROCEDURE — 99213 OFFICE O/P EST LOW 20 MIN: CPT | Performed by: NURSE PRACTITIONER

## 2025-04-15 PROCEDURE — 99397 PER PM REEVAL EST PAT 65+ YR: CPT | Performed by: NURSE PRACTITIONER

## 2025-04-15 PROCEDURE — G0444 DEPRESSION SCREEN ANNUAL: HCPCS | Performed by: NURSE PRACTITIONER

## 2025-04-15 PROCEDURE — 1159F MED LIST DOCD IN RCRD: CPT | Performed by: NURSE PRACTITIONER

## 2025-04-15 PROCEDURE — 1036F TOBACCO NON-USER: CPT | Performed by: NURSE PRACTITIONER

## 2025-04-15 PROCEDURE — 1170F FXNL STATUS ASSESSED: CPT | Performed by: NURSE PRACTITIONER

## 2025-04-15 PROCEDURE — 1123F ACP DISCUSS/DSCN MKR DOCD: CPT | Performed by: NURSE PRACTITIONER

## 2025-04-15 PROCEDURE — 1158F ADVNC CARE PLAN TLK DOCD: CPT | Performed by: NURSE PRACTITIONER

## 2025-04-15 PROCEDURE — 99497 ADVNCD CARE PLAN 30 MIN: CPT | Performed by: NURSE PRACTITIONER

## 2025-04-15 PROCEDURE — G0438 PPPS, INITIAL VISIT: HCPCS | Performed by: NURSE PRACTITIONER

## 2025-04-15 PROCEDURE — 3008F BODY MASS INDEX DOCD: CPT | Performed by: NURSE PRACTITIONER

## 2025-04-15 RX ORDER — FLUOCINONIDE TOPICAL SOLUTION USP, 0.05% 0.5 MG/ML
SOLUTION TOPICAL
Qty: 20 ML | Refills: 1 | Status: SHIPPED | OUTPATIENT
Start: 2025-04-15 | End: 2025-04-15

## 2025-04-15 RX ORDER — ROSUVASTATIN CALCIUM 20 MG/1
20 TABLET, COATED ORAL DAILY
Qty: 100 TABLET | Refills: 3 | Status: SHIPPED | OUTPATIENT
Start: 2025-04-15 | End: 2026-05-20

## 2025-04-15 RX ORDER — FLUOCINOLONE ACETONIDE 0.11 MG/ML
OIL AURICULAR (OTIC)
Qty: 20 ML | Refills: 1 | Status: SHIPPED | OUTPATIENT
Start: 2025-04-15

## 2025-04-15 ASSESSMENT — PATIENT HEALTH QUESTIONNAIRE - PHQ9
SUM OF ALL RESPONSES TO PHQ9 QUESTIONS 1 AND 2: 0
1. LITTLE INTEREST OR PLEASURE IN DOING THINGS: NOT AT ALL
2. FEELING DOWN, DEPRESSED OR HOPELESS: NOT AT ALL

## 2025-04-15 ASSESSMENT — ENCOUNTER SYMPTOMS
OCCASIONAL FEELINGS OF UNSTEADINESS: 0
DEPRESSION: 0
LOSS OF SENSATION IN FEET: 0

## 2025-04-15 ASSESSMENT — ACTIVITIES OF DAILY LIVING (ADL)
BATHING: INDEPENDENT
TAKING_MEDICATION: INDEPENDENT
DOING_HOUSEWORK: INDEPENDENT
MANAGING_FINANCES: INDEPENDENT
GROCERY_SHOPPING: INDEPENDENT
DRESSING: INDEPENDENT

## 2025-04-15 NOTE — ASSESSMENT & PLAN NOTE
Begin Fluocinonide drops in RIGHT ear   Orders:    fluocinonide (Lidex) 0.05 % external solution; INSTILL 3 DROPS IN AFFECTED EAR TWICE DAILY X 3 DAYS AND AS NEEDED

## 2025-04-15 NOTE — PATIENT INSTRUCTIONS
Thank you for seeing me today Rose Grigsby, it was a pleasure to see you again!    Today we did your Annual Medicare Wellness Exam and discussed the following:     Continue medications as prescribed     Try fluocinonide drops in RIGHT ear and f/u as needed     Lab work ordered today.  Please have your blood drawn in the next 1-2 weeks.  You need to be FASTING for 12 hours prior to blood draw.  You may only have water.  Please contact your insurance company to ask about the best location to get blood drawn.  We will contact you with the results of your blood work and any necessary adjustments  to your plan of care, if you do not hear from us within 3-5 days of having your blood drawn, please call the office at [phone].    For assistance with scheduling referrals or consultations, please call [phone] or [phone].    For laboratory, radiology, and other tests, please call [phone] ([phone] for pediatrics).   For laboratory locations, please visit [email or web address]   If you do not get results within 7-10 days, or you have any questions or concerns, please send a message, call the office ([phone]), or return to the office for a follow-up appointment.     For acute/sick visits, if you are unable to get an office visit, you can do a  On Demand Virtual Visit that is accessible via your My Chart account.  For emergencies, call 9-1-1 or go to the nearest Emergency Department.     Please schedule additional appointment(s) to address concern(s) not addressed today.    Please review prescription inserts and published information for possible adverse effects of all medications.     In general, results are discussed over the phone or via  [employer].     You can see your health information, review clinical summaries from office visits & test results online when you follow your health with MY  Chart, a personal health record.   To sign up go to  [email or web address].   If you need assistance with signing up or trouble getting into your account call [email or web address] Patient Line 24/7 at [phone]     RTC ANNUALLY AND AS NEEDED     ~Kari Lin NP

## 2025-04-15 NOTE — ASSESSMENT & PLAN NOTE
Continue Rosuvastatin 20 mg daily   Orders:    rosuvastatin (Crestor) 20 mg tablet; Take 1 tablet (20 mg) by mouth once daily.

## 2025-04-15 NOTE — ASSESSMENT & PLAN NOTE
I spent > 16 minutes face to face with Rose Grigsby discussing his/her advance directives, including a Living Will, Healthcare POA as well as specific end of life choices and/or directives, and pt was provided with packet to return next visit.    HCPOA: Spouse   Pt is Full Code

## 2025-05-02 DIAGNOSIS — Z12.11 SPECIAL SCREENING FOR MALIGNANT NEOPLASMS, COLON: ICD-10-CM

## 2025-05-02 LAB
CHOLEST SERPL-MCNC: 168 MG/DL
CHOLEST/HDLC SERPL: 2.6 (CALC)
HDLC SERPL-MCNC: 64 MG/DL
LDLC SERPL CALC-MCNC: 84 MG/DL (CALC)
MEV IGG SER IA-ACNC: NORMAL
NONHDLC SERPL-MCNC: 104 MG/DL (CALC)
TRIGL SERPL-MCNC: 106 MG/DL

## 2025-05-02 RX ORDER — POLYETHYLENE GLYCOL 3350, SODIUM SULFATE ANHYDROUS, SODIUM BICARBONATE, SODIUM CHLORIDE, POTASSIUM CHLORIDE 236; 22.74; 6.74; 5.86; 2.97 G/4L; G/4L; G/4L; G/4L; G/4L
4 POWDER, FOR SOLUTION ORAL ONCE
Qty: 4000 ML | Refills: 0 | Status: SHIPPED | OUTPATIENT
Start: 2025-05-02 | End: 2025-05-02

## 2025-05-05 LAB
CHOLEST SERPL-MCNC: 168 MG/DL
CHOLEST/HDLC SERPL: 2.6 (CALC)
HDLC SERPL-MCNC: 64 MG/DL
LDLC SERPL CALC-MCNC: 84 MG/DL (CALC)
MEV IGG SER IA-ACNC: >300 AU/ML
NONHDLC SERPL-MCNC: 104 MG/DL (CALC)
TRIGL SERPL-MCNC: 106 MG/DL

## 2025-06-19 ENCOUNTER — APPOINTMENT (OUTPATIENT)
Dept: GASTROENTEROLOGY | Facility: EXTERNAL LOCATION | Age: 65
End: 2025-06-19
Payer: MEDICARE

## 2025-06-19 DIAGNOSIS — Z80.0 FAMILY HISTORY OF MALIGNANT NEOPLASM OF GASTROINTESTINAL TRACT: ICD-10-CM

## 2025-06-19 DIAGNOSIS — Z12.11 COLON CANCER SCREENING: ICD-10-CM

## 2025-06-19 DIAGNOSIS — D12.3 BENIGN NEOPLASM OF TRANSVERSE COLON: ICD-10-CM

## 2025-06-19 DIAGNOSIS — Z12.11 COLON CANCER SCREENING: Primary | ICD-10-CM

## 2025-06-19 PROCEDURE — 88305 TISSUE EXAM BY PATHOLOGIST: CPT | Performed by: PATHOLOGY

## 2025-06-19 PROCEDURE — 0753T DGTZ GLS MCRSCP SLD LEVEL IV: CPT

## 2025-06-19 PROCEDURE — 45380 COLONOSCOPY AND BIOPSY: CPT | Performed by: INTERNAL MEDICINE

## 2025-06-19 PROCEDURE — 88305 TISSUE EXAM BY PATHOLOGIST: CPT

## 2025-06-19 PROCEDURE — 45385 COLONOSCOPY W/LESION REMOVAL: CPT | Performed by: INTERNAL MEDICINE

## 2025-06-20 ENCOUNTER — LAB REQUISITION (OUTPATIENT)
Dept: LAB | Facility: HOSPITAL | Age: 65
End: 2025-06-20
Payer: MEDICARE

## 2025-07-01 LAB
LABORATORY COMMENT REPORT: NORMAL
PATH REPORT.FINAL DX SPEC: NORMAL
PATH REPORT.GROSS SPEC: NORMAL
PATH REPORT.RELEVANT HX SPEC: NORMAL
PATH REPORT.TOTAL CANCER: NORMAL

## 2025-08-31 ENCOUNTER — OFFICE VISIT (OUTPATIENT)
Dept: URGENT CARE | Age: 65
End: 2025-08-31
Payer: MEDICARE

## 2025-08-31 ASSESSMENT — ENCOUNTER SYMPTOMS: BACK PAIN: 1

## 2025-09-03 ENCOUNTER — TELEPHONE (OUTPATIENT)
Dept: PRIMARY CARE | Facility: CLINIC | Age: 65
End: 2025-09-03
Payer: MEDICARE

## 2025-09-05 ENCOUNTER — OFFICE VISIT (OUTPATIENT)
Dept: PRIMARY CARE | Facility: CLINIC | Age: 65
End: 2025-09-05
Payer: MEDICARE

## 2025-09-05 VITALS — WEIGHT: 169.4 LBS | OXYGEN SATURATION: 96 % | HEIGHT: 63 IN | BODY MASS INDEX: 30.02 KG/M2 | HEART RATE: 63 BPM

## 2025-09-05 DIAGNOSIS — M54.50 ACUTE MIDLINE LOW BACK PAIN WITHOUT SCIATICA: Primary | ICD-10-CM

## 2025-09-05 PROCEDURE — 1036F TOBACCO NON-USER: CPT | Performed by: NURSE PRACTITIONER

## 2025-09-05 PROCEDURE — 1159F MED LIST DOCD IN RCRD: CPT | Performed by: NURSE PRACTITIONER

## 2025-09-05 PROCEDURE — 99213 OFFICE O/P EST LOW 20 MIN: CPT | Performed by: NURSE PRACTITIONER

## 2025-09-05 PROCEDURE — 3008F BODY MASS INDEX DOCD: CPT | Performed by: NURSE PRACTITIONER

## 2025-09-05 PROCEDURE — 1160F RVW MEDS BY RX/DR IN RCRD: CPT | Performed by: NURSE PRACTITIONER

## 2025-09-05 RX ORDER — LIDOCAINE 50 MG/G
1 PATCH TOPICAL DAILY
Qty: 30 PATCH | Refills: 0 | Status: SHIPPED | OUTPATIENT
Start: 2025-09-05

## 2026-04-14 ENCOUNTER — APPOINTMENT (OUTPATIENT)
Dept: PRIMARY CARE | Facility: CLINIC | Age: 66
End: 2026-04-14
Payer: MEDICARE